# Patient Record
Sex: MALE | Race: WHITE | NOT HISPANIC OR LATINO | Employment: OTHER | URBAN - METROPOLITAN AREA
[De-identification: names, ages, dates, MRNs, and addresses within clinical notes are randomized per-mention and may not be internally consistent; named-entity substitution may affect disease eponyms.]

---

## 2023-12-18 ENCOUNTER — HOSPITAL ENCOUNTER (INPATIENT)
Facility: OTHER | Age: 73
LOS: 3 days | Discharge: HOME | DRG: 322 | End: 2023-12-21
Attending: EMERGENCY MEDICINE | Admitting: INTERNAL MEDICINE
Payer: MEDICARE

## 2023-12-18 DIAGNOSIS — I25.10 CAD (CORONARY ARTERY DISEASE): ICD-10-CM

## 2023-12-18 DIAGNOSIS — I25.10 CORONARY ARTERY DISEASE: ICD-10-CM

## 2023-12-18 DIAGNOSIS — R07.9 CHEST PAIN: ICD-10-CM

## 2023-12-18 DIAGNOSIS — I21.3 STEMI (ST ELEVATION MYOCARDIAL INFARCTION): Primary | ICD-10-CM

## 2023-12-18 LAB
ALBUMIN SERPL BCP-MCNC: 3.8 G/DL (ref 3.5–5.2)
ALP SERPL-CCNC: 62 U/L (ref 55–135)
ALT SERPL W/O P-5'-P-CCNC: 25 U/L (ref 10–44)
ANION GAP SERPL CALC-SCNC: 9 MMOL/L (ref 8–16)
APTT PPP: 24 SEC (ref 21–32)
APTT PPP: 44.9 SEC (ref 21–32)
AST SERPL-CCNC: 57 U/L (ref 10–40)
AV INDEX (PROSTH): 0.68
AV MEAN GRADIENT: 5 MMHG
AV PEAK GRADIENT: 8 MMHG
AV VALVE AREA BY VELOCITY RATIO: 2.94 CM²
AV VALVE AREA: 2.96 CM²
AV VELOCITY RATIO: 0.68
BASOPHILS # BLD AUTO: 0.04 K/UL (ref 0–0.2)
BASOPHILS NFR BLD: 0.3 % (ref 0–1.9)
BILIRUB SERPL-MCNC: 1.1 MG/DL (ref 0.1–1)
BNP SERPL-MCNC: 24 PG/ML (ref 0–99)
BSA FOR ECHO PROCEDURE: 1.95 M2
BUN SERPL-MCNC: 15 MG/DL (ref 8–23)
CALCIUM SERPL-MCNC: 9.1 MG/DL (ref 8.7–10.5)
CHLORIDE SERPL-SCNC: 108 MMOL/L (ref 95–110)
CHOLEST SERPL-MCNC: 176 MG/DL (ref 120–199)
CHOLEST/HDLC SERPL: 4.9 {RATIO} (ref 2–5)
CO2 SERPL-SCNC: 23 MMOL/L (ref 23–29)
CREAT SERPL-MCNC: 1.1 MG/DL (ref 0.5–1.4)
CV ECHO LV RWT: 0.34 CM
DIFFERENTIAL METHOD BLD: ABNORMAL
DOP CALC AO PEAK VEL: 1.43 M/S
DOP CALC AO VTI: 30.9 CM
DOP CALC LVOT AREA: 4.3 CM2
DOP CALC LVOT DIAMETER: 2.35 CM
DOP CALC LVOT PEAK VEL: 0.97 M/S
DOP CALC LVOT STROKE VOLUME: 91.47 CM3
DOP CALCLVOT PEAK VEL VTI: 21.1 CM
E WAVE DECELERATION TIME: 274.85 MSEC
E/A RATIO: 0.75
E/E' RATIO: 9.5 M/S
ECHO LV POSTERIOR WALL: 0.8 CM (ref 0.6–1.1)
EJECTION FRACTION: 45 %
EOSINOPHIL # BLD AUTO: 0 K/UL (ref 0–0.5)
EOSINOPHIL NFR BLD: 0.3 % (ref 0–8)
ERYTHROCYTE [DISTWIDTH] IN BLOOD BY AUTOMATED COUNT: 13.1 % (ref 11.5–14.5)
ERYTHROCYTE [DISTWIDTH] IN BLOOD BY AUTOMATED COUNT: 13.3 % (ref 11.5–14.5)
EST. GFR  (NO RACE VARIABLE): >60 ML/MIN/1.73 M^2
ESTIMATED AVG GLUCOSE: 100 MG/DL (ref 68–131)
FRACTIONAL SHORTENING: 32 % (ref 28–44)
GLUCOSE SERPL-MCNC: 151 MG/DL (ref 70–110)
HBA1C MFR BLD: 5.1 % (ref 4–5.6)
HCT VFR BLD AUTO: 43 % (ref 40–54)
HCT VFR BLD AUTO: 43.8 % (ref 40–54)
HDLC SERPL-MCNC: 36 MG/DL (ref 40–75)
HDLC SERPL: 20.5 % (ref 20–50)
HGB BLD-MCNC: 14.5 G/DL (ref 14–18)
HGB BLD-MCNC: 14.8 G/DL (ref 14–18)
IMM GRANULOCYTES # BLD AUTO: 0.05 K/UL (ref 0–0.04)
IMM GRANULOCYTES NFR BLD AUTO: 0.4 % (ref 0–0.5)
INR PPP: 1 (ref 0.8–1.2)
INTERVENTRICULAR SEPTUM: 0.81 CM (ref 0.6–1.1)
IVRT: 53.28 MSEC
LA MAJOR: 5.75 CM
LA MINOR: 5.41 CM
LA WIDTH: 3.5 CM
LDLC SERPL CALC-MCNC: 116.8 MG/DL (ref 63–159)
LEFT ATRIUM SIZE: 3.44 CM
LEFT ATRIUM VOLUME INDEX: 28 ML/M2
LEFT ATRIUM VOLUME: 57.05 CM3
LEFT INTERNAL DIMENSION IN SYSTOLE: 3.21 CM (ref 2.1–4)
LEFT VENTRICLE DIASTOLIC VOLUME INDEX: 51.08 ML/M2
LEFT VENTRICLE DIASTOLIC VOLUME: 104.2 ML
LEFT VENTRICLE MASS INDEX: 61 G/M2
LEFT VENTRICLE SYSTOLIC VOLUME INDEX: 20.2 ML/M2
LEFT VENTRICLE SYSTOLIC VOLUME: 41.21 ML
LEFT VENTRICULAR INTERNAL DIMENSION IN DIASTOLE: 4.74 CM (ref 3.5–6)
LEFT VENTRICULAR MASS: 125.03 G
LV LATERAL E/E' RATIO: 7.6 M/S
LV SEPTAL E/E' RATIO: 12.67 M/S
LVOT MG: 2.22 MMHG
LVOT MV: 0.71 CM/S
LYMPHOCYTES # BLD AUTO: 0.9 K/UL (ref 1–4.8)
LYMPHOCYTES NFR BLD: 7.8 % (ref 18–48)
MCH RBC QN AUTO: 31.5 PG (ref 27–31)
MCH RBC QN AUTO: 31.9 PG (ref 27–31)
MCHC RBC AUTO-ENTMCNC: 33.7 G/DL (ref 32–36)
MCHC RBC AUTO-ENTMCNC: 33.8 G/DL (ref 32–36)
MCV RBC AUTO: 93 FL (ref 82–98)
MCV RBC AUTO: 94 FL (ref 82–98)
MONOCYTES # BLD AUTO: 0.9 K/UL (ref 0.3–1)
MONOCYTES NFR BLD: 7.9 % (ref 4–15)
MV PEAK A VEL: 1.01 M/S
MV PEAK E VEL: 0.76 M/S
MV STENOSIS PRESSURE HALF TIME: 65.49 MS
MV VALVE AREA P 1/2 METHOD: 3.36 CM2
NEUTROPHILS # BLD AUTO: 9.9 K/UL (ref 1.8–7.7)
NEUTROPHILS NFR BLD: 83.3 % (ref 38–73)
NONHDLC SERPL-MCNC: 140 MG/DL
NRBC BLD-RTO: 0 /100 WBC
PISA MRMAX VEL: 4.31 M/S
PISA TR MAX VEL: 2.36 M/S
PLATELET # BLD AUTO: 187 K/UL (ref 150–450)
PLATELET # BLD AUTO: 190 K/UL (ref 150–450)
PMV BLD AUTO: 10 FL (ref 9.2–12.9)
PMV BLD AUTO: 10.2 FL (ref 9.2–12.9)
POTASSIUM SERPL-SCNC: 4.1 MMOL/L (ref 3.5–5.1)
PROT SERPL-MCNC: 7.3 G/DL (ref 6–8.4)
PROTHROMBIN TIME: 10.9 SEC (ref 9–12.5)
PULM VEIN S/D RATIO: 1.61
PV PEAK D VEL: 0.33 M/S
PV PEAK GRADIENT: 4 MMHG
PV PEAK S VEL: 0.53 M/S
PV PEAK VELOCITY: 1.03 M/S
RA MAJOR: 4.37 CM
RA PRESSURE ESTIMATED: 3 MMHG
RA WIDTH: 3.4 CM
RBC # BLD AUTO: 4.61 M/UL (ref 4.6–6.2)
RBC # BLD AUTO: 4.64 M/UL (ref 4.6–6.2)
RV TB RVSP: 5 MMHG
SODIUM SERPL-SCNC: 140 MMOL/L (ref 136–145)
TDI LATERAL: 0.1 M/S
TDI SEPTAL: 0.06 M/S
TDI: 0.08 M/S
TR MAX PG: 22 MMHG
TRIGL SERPL-MCNC: 116 MG/DL (ref 30–150)
TROPONIN I SERPL DL<=0.01 NG/ML-MCNC: 44.68 NG/ML (ref 0–0.03)
TROPONIN I SERPL DL<=0.01 NG/ML-MCNC: 6.57 NG/ML (ref 0–0.03)
TSH SERPL DL<=0.005 MIU/L-ACNC: 2.16 UIU/ML (ref 0.4–4)
TV REST PULMONARY ARTERY PRESSURE: 25 MMHG
WBC # BLD AUTO: 11.74 K/UL (ref 3.9–12.7)
WBC # BLD AUTO: 11.87 K/UL (ref 3.9–12.7)
Z-SCORE OF LEFT VENTRICULAR DIMENSION IN END DIASTOLE: -2.49
Z-SCORE OF LEFT VENTRICULAR DIMENSION IN END SYSTOLE: -1.18

## 2023-12-18 PROCEDURE — 63600175 PHARM REV CODE 636 W HCPCS: Performed by: INTERNAL MEDICINE

## 2023-12-18 PROCEDURE — 25000003 PHARM REV CODE 250: Performed by: EMERGENCY MEDICINE

## 2023-12-18 PROCEDURE — 85610 PROTHROMBIN TIME: CPT | Performed by: EMERGENCY MEDICINE

## 2023-12-18 PROCEDURE — 80053 COMPREHEN METABOLIC PANEL: CPT | Performed by: EMERGENCY MEDICINE

## 2023-12-18 PROCEDURE — 25000003 PHARM REV CODE 250: Performed by: INTERNAL MEDICINE

## 2023-12-18 PROCEDURE — 84443 ASSAY THYROID STIM HORMONE: CPT | Performed by: EMERGENCY MEDICINE

## 2023-12-18 PROCEDURE — 85027 COMPLETE CBC AUTOMATED: CPT | Performed by: INTERNAL MEDICINE

## 2023-12-18 PROCEDURE — 99291 CRITICAL CARE FIRST HOUR: CPT

## 2023-12-18 PROCEDURE — 99223 1ST HOSP IP/OBS HIGH 75: CPT | Mod: 25,,, | Performed by: INTERNAL MEDICINE

## 2023-12-18 PROCEDURE — 93010 ELECTROCARDIOGRAM REPORT: CPT | Mod: ,,, | Performed by: INTERNAL MEDICINE

## 2023-12-18 PROCEDURE — 36415 COLL VENOUS BLD VENIPUNCTURE: CPT | Performed by: EMERGENCY MEDICINE

## 2023-12-18 PROCEDURE — 93005 ELECTROCARDIOGRAM TRACING: CPT

## 2023-12-18 PROCEDURE — 83036 HEMOGLOBIN GLYCOSYLATED A1C: CPT | Performed by: EMERGENCY MEDICINE

## 2023-12-18 PROCEDURE — 36415 COLL VENOUS BLD VENIPUNCTURE: CPT | Performed by: INTERNAL MEDICINE

## 2023-12-18 PROCEDURE — 84484 ASSAY OF TROPONIN QUANT: CPT | Mod: 91 | Performed by: EMERGENCY MEDICINE

## 2023-12-18 PROCEDURE — 20000000 HC ICU ROOM

## 2023-12-18 PROCEDURE — 85730 THROMBOPLASTIN TIME PARTIAL: CPT | Mod: 91 | Performed by: INTERNAL MEDICINE

## 2023-12-18 PROCEDURE — 94761 N-INVAS EAR/PLS OXIMETRY MLT: CPT

## 2023-12-18 PROCEDURE — 85025 COMPLETE CBC W/AUTO DIFF WBC: CPT | Performed by: EMERGENCY MEDICINE

## 2023-12-18 PROCEDURE — 85730 THROMBOPLASTIN TIME PARTIAL: CPT | Performed by: EMERGENCY MEDICINE

## 2023-12-18 PROCEDURE — 83880 ASSAY OF NATRIURETIC PEPTIDE: CPT | Performed by: EMERGENCY MEDICINE

## 2023-12-18 PROCEDURE — 80061 LIPID PANEL: CPT | Performed by: EMERGENCY MEDICINE

## 2023-12-18 RX ORDER — SODIUM CHLORIDE 9 MG/ML
INJECTION, SOLUTION INTRAVENOUS CONTINUOUS
Status: DISCONTINUED | OUTPATIENT
Start: 2023-12-19 | End: 2023-12-19

## 2023-12-18 RX ORDER — SODIUM CHLORIDE 0.9 % (FLUSH) 0.9 %
10 SYRINGE (ML) INJECTION
Status: DISCONTINUED | OUTPATIENT
Start: 2023-12-18 | End: 2023-12-19

## 2023-12-18 RX ORDER — ACETAMINOPHEN 325 MG/1
650 TABLET ORAL EVERY 6 HOURS PRN
Status: DISCONTINUED | OUTPATIENT
Start: 2023-12-18 | End: 2023-12-21 | Stop reason: HOSPADM

## 2023-12-18 RX ORDER — ONDANSETRON 4 MG/1
4 TABLET, ORALLY DISINTEGRATING ORAL EVERY 6 HOURS PRN
Status: DISCONTINUED | OUTPATIENT
Start: 2023-12-18 | End: 2023-12-21 | Stop reason: HOSPADM

## 2023-12-18 RX ORDER — SODIUM CHLORIDE 0.9 % (FLUSH) 0.9 %
10 SYRINGE (ML) INJECTION
Status: DISCONTINUED | OUTPATIENT
Start: 2023-12-18 | End: 2023-12-21 | Stop reason: HOSPADM

## 2023-12-18 RX ORDER — HEPARIN SODIUM,PORCINE/D5W 25000/250
0-24.5 INTRAVENOUS SOLUTION INTRAVENOUS CONTINUOUS
Status: ACTIVE | OUTPATIENT
Start: 2023-12-18 | End: 2023-12-19

## 2023-12-18 RX ORDER — CLOPIDOGREL 300 MG/1
600 TABLET, FILM COATED ORAL
Status: COMPLETED | OUTPATIENT
Start: 2023-12-18 | End: 2023-12-18

## 2023-12-18 RX ORDER — NITROGLYCERIN 40 MG/1
1 PATCH TRANSDERMAL DAILY
Status: DISCONTINUED | OUTPATIENT
Start: 2023-12-18 | End: 2023-12-19

## 2023-12-18 RX ORDER — ONDANSETRON 2 MG/ML
4 INJECTION INTRAMUSCULAR; INTRAVENOUS EVERY 6 HOURS PRN
Status: DISCONTINUED | OUTPATIENT
Start: 2023-12-18 | End: 2023-12-21 | Stop reason: HOSPADM

## 2023-12-18 RX ORDER — MUPIROCIN 20 MG/G
OINTMENT TOPICAL 2 TIMES DAILY
Status: DISCONTINUED | OUTPATIENT
Start: 2023-12-18 | End: 2023-12-21 | Stop reason: HOSPADM

## 2023-12-18 RX ORDER — ATORVASTATIN CALCIUM 20 MG/1
80 TABLET, FILM COATED ORAL
Status: COMPLETED | OUTPATIENT
Start: 2023-12-18 | End: 2023-12-18

## 2023-12-18 RX ORDER — TALC
6 POWDER (GRAM) TOPICAL NIGHTLY PRN
Status: DISCONTINUED | OUTPATIENT
Start: 2023-12-18 | End: 2023-12-21 | Stop reason: HOSPADM

## 2023-12-18 RX ORDER — CLOPIDOGREL BISULFATE 75 MG/1
75 TABLET ORAL DAILY
Status: DISCONTINUED | OUTPATIENT
Start: 2023-12-19 | End: 2023-12-21 | Stop reason: HOSPADM

## 2023-12-18 RX ORDER — HEPARIN SODIUM,PORCINE/D5W 25000/250
0-24.5 INTRAVENOUS SOLUTION INTRAVENOUS CONTINUOUS
Status: DISCONTINUED | OUTPATIENT
Start: 2023-12-18 | End: 2023-12-18

## 2023-12-18 RX ORDER — DIPHENHYDRAMINE HCL 25 MG
25 CAPSULE ORAL
Status: DISCONTINUED | OUTPATIENT
Start: 2023-12-19 | End: 2023-12-19 | Stop reason: HOSPADM

## 2023-12-18 RX ORDER — ASPIRIN 81 MG/1
81 TABLET ORAL DAILY
Status: DISCONTINUED | OUTPATIENT
Start: 2023-12-19 | End: 2023-12-21 | Stop reason: HOSPADM

## 2023-12-18 RX ADMIN — HEPARIN SODIUM 12 UNITS/KG/HR: 10000 INJECTION, SOLUTION INTRAVENOUS at 08:12

## 2023-12-18 RX ADMIN — ATORVASTATIN CALCIUM 80 MG: 20 TABLET, FILM COATED ORAL at 12:12

## 2023-12-18 RX ADMIN — CLOPIDOGREL BISULFATE 600 MG: 300 TABLET, FILM COATED ORAL at 10:12

## 2023-12-18 RX ADMIN — MUPIROCIN: 20 OINTMENT TOPICAL at 08:12

## 2023-12-18 RX ADMIN — NITROGLYCERIN 1 PATCH: 0.2 PATCH TRANSDERMAL at 12:12

## 2023-12-18 RX ADMIN — Medication 12 UNITS/KG/HR: at 10:12

## 2023-12-18 RX ADMIN — MUPIROCIN: 20 OINTMENT TOPICAL at 12:12

## 2023-12-18 NOTE — ASSESSMENT & PLAN NOTE
CAD  - EKG unavailable for review from [facility] ED at time of H&P but reportedly lateral ST elevations. EKG here with sinus bradycardia, nonspecific ST-T changes. Troponin 9.85 from [facility] records, 6.57 here.  - Reports extensive family history of CAD and MI resulting in death of his brother at age 55.  - Received aspirin at [facility]; continue 81mg PO daily. Clopidogrel loaded 600mg PO x1 in ED, continue 75mg PO daily. Continue heparin gtt. Start nitroglycerin 0.2mg/hr patch transdermal daily.  - Check lipid panel, TSH, HgbA1c.  - Cardiology consulted; appreciate assistance. Plan for [facility] tomorrow AM.

## 2023-12-18 NOTE — HPI
Mr. Spencer is a 73/M with no significant PMH who presented to [facility] as a transfer from [facility] ED with a one day history of chest discomfort, nausea, and diaphoresis worsened with activity. He reports symptoms have been off and on over the past month and a half; had started taking glutamine as a supplement and noted after initiation episodes of intermittent chest burning. Stopped taking glutamine after one week and symptoms improved some but noted intermittent recurring chest burning worse with exertion. This gradually worsened over the following weeks with more frequent episodes caused with less exertion until the morning of presentation he noted significant chest burning radiating to bilateral arms, diaphoresis, and nausea as well as shortness of breath. Symptoms were severe enough he decided to contact 911. He notes mild sinus congestion and cough over the past week but did not have significant fevers, chills, palpitations, abdominal pain, or vomiting. Upon arrival to [facility] ED he was noted to have lateral ST elevations on EKG and troponin of 9.85. He received aspirin PO and tenecteplase as well as nitroglycerin SL and ointment topically. He noted improvement in his symptoms with these treatments but reports mild burning noted to chest at time of evaluation. He was subsequently transferred to [facility] for cardiology evaluation for catheterization. Cardiology consulted in ED and recommended heparin gtt, aspirin, clopidogrel, and LHC for 12/19. Hospital medicine was contacted for admission.

## 2023-12-18 NOTE — Clinical Note
170 ml of contrast were injected throughout the case. 30 mL of contrast was the total wasted during the case. 200 mL was the total amount used during the case.

## 2023-12-18 NOTE — SUBJECTIVE & OBJECTIVE
"History reviewed. No pertinent past medical history.    Past Surgical History:   Procedure Laterality Date    TONSILLECTOMY      WISDOM TOOTH EXTRACTION         Review of patient's allergies indicates:   Allergen Reactions    Meperidine Other (See Comments)     Oversedation - reports "was out for three days" after receiving in past     No current facility-administered medications on file prior to encounter.     No current outpatient medications on file prior to encounter.     Family History       Problem Relation (Age of Onset)    Heart disease Mother, Father, Brother    Hypertension Brother    Stroke Mother          Tobacco Use    Smoking status: Never    Smokeless tobacco: Never   Substance and Sexual Activity    Alcohol use: Yes     Comment: Socially - less than monthly    Drug use: Not Currently     Comment: Rarely marijuana when in ; none since    Sexual activity: Not on file     Review of Systems   Constitutional:  Positive for diaphoresis and fatigue. Negative for chills and fever.   HENT:  Positive for sinus pressure. Negative for sore throat and trouble swallowing.    Eyes:  Negative for pain and visual disturbance.   Respiratory:  Positive for cough and shortness of breath.    Cardiovascular:  Positive for chest pain. Negative for palpitations.   Gastrointestinal:  Positive for nausea. Negative for abdominal pain and vomiting.   Genitourinary:  Negative for difficulty urinating and dysuria.   Musculoskeletal:  Negative for arthralgias and joint swelling.   Skin:  Negative for rash and wound.   Neurological:  Negative for weakness and numbness.     Objective:     Vital Signs (Most Recent):  Pulse: 63 (12/18/23 1002)  Resp: 19 (12/18/23 0928)  BP: 99/63 (12/18/23 1002)  SpO2: 100 % (12/18/23 1002) Vital Signs (24h Range):  Pulse:  [61-63] 63  Resp:  [19] 19  SpO2:  [100 %] 100 %  BP: (97-99)/(56-63) 99/63     Weight: 81.6 kg (180 lb)  Body mass index is 25.83 kg/m².     Physical Exam  Vitals and " nursing note reviewed.   Constitutional:       General: He is not in acute distress.     Appearance: He is well-developed.   HENT:      Head: Normocephalic and atraumatic.   Eyes:      General:         Right eye: No discharge.         Left eye: No discharge.      Conjunctiva/sclera: Conjunctivae normal.   Cardiovascular:      Rate and Rhythm: Normal rate.      Pulses: Normal pulses.   Pulmonary:      Effort: Pulmonary effort is normal. No respiratory distress.   Abdominal:      Palpations: Abdomen is soft.      Tenderness: There is no abdominal tenderness.   Musculoskeletal:         General: Normal range of motion.      Right lower leg: No edema.      Left lower leg: No edema.   Skin:     General: Skin is warm and dry.   Neurological:      Mental Status: He is alert and oriented to person, place, and time.        Significant Labs:   CBC:  Recent Labs   Lab 12/18/23  0944   WBC 11.87   HGB 14.8   HCT 43.8      GRAN 83.3*  9.9*   LYMPH 7.8*  0.9*   MONO 7.9  0.9   EOS 0.0   BASO 0.04   CMP:  Recent Labs   Lab 12/18/23  0944      K 4.1      CO2 23   BUN 15   CREATININE 1.1   *   CALCIUM 9.1   ALKPHOS 62   AST 57*   ALT 25   BILITOT 1.1*   PROT 7.3   ALBUMIN 3.8   ANIONGAP 9     Recent Labs   Lab 12/18/23  0944   TROPONINI 6.574*     Significant Imaging: I have reviewed and interpreted all pertinent imaging results/findings within the past 24 hours.

## 2023-12-18 NOTE — ED TRIAGE NOTES
Pt transfer from [facility] for STEMI. Pt reports crushing CP started 2 weeks ago, but he thought it may be contributed to a new maedication he is taking for his eye, so he stopped medication. Pt states CP continued. Pt denies any known medical hx. Initial EKG at [facility] read STEMI. Pt given 324 ASA, 4 SL nitro, nitro paste, and TNK bolus from [place]/ EMS with improvement in symptoms. Pt reports mild burning CP at this time. Initial EKG on arrival no longer showed STEMI.

## 2023-12-18 NOTE — ED PROVIDER NOTES
"Encounter Date: 12/18/2023    SCRIBE #1 NOTE: I, Lizbeth Toscano, am scribing for, and in the presence of,  Kiki Lyons MD. I have scribed the entire note.       History     Chief Complaint   Patient presents with    Chest Pain     Pt transfer from [facility] for STEMI. Pt endorsing chest burning and nausea x weeks but stating "I thought it was the new pills I was taking for my eyes but it was still there when I stopped taking them." Denies cardiac hx and SOB. Pt given 324 ASA, 45 mg TNK, 3 SL nitro, and 1 inch nitro paste pta.      Time seen by provider: 9:16 AM    This is a 73 y.o. male who presents via transfer from [facility] for STEMI activation in the setting of chest pain. He states he has had chest discomfort along with sinus congestion for the past four days. The latter was improving with irrigation. He awoke early this morning sweating with more intense pain. At its peak he rates it at a 9/10, radiating into the back and arms. The sweating ceased after approximately one hour and he states he currently only has mild chest discomfort and nausea. He denies any shortness of breath. Patient was taken to [facility] by EMS and improved with aspirin and a TNK bolus. This is the extent of the patient's complaints at this time.    The history is provided by the patient and the EMS personnel.     Review of patient's allergies indicates:   Allergen Reactions    Meperidine Other (See Comments)     Oversedation - reports "was out for three days" after receiving in past     History reviewed. No pertinent past medical history.  Past Surgical History:   Procedure Laterality Date    CORONARY ANGIOGRAPHY N/A 12/19/2023    Procedure: ANGIOGRAM, CORONARY ARTERY;  Surgeon: Grace Panda MD;  Location: [facility] CATH LAB;  Service: Cardiology;  Laterality: N/A;    STENT, DRUG ELUTING, SINGLE VESSEL, CORONARY  12/19/2023    Procedure: Stent, Drug Eluting, Single Vessel, Coronary;  Surgeon: Grace Panda MD;  Location: " [facility] CATH LAB;  Service: Cardiology;;    TONSILLECTOMY      WISDOM TOOTH EXTRACTION       Family History   Problem Relation Age of Onset    Stroke Mother     Heart disease Mother     Heart disease Father     Hypertension Brother     Heart disease Brother      Social History     Tobacco Use    Smoking status: Never    Smokeless tobacco: Never   Substance Use Topics    Alcohol use: Yes     Comment: Socially - less than monthly    Drug use: Not Currently     Comment: Rarely marijuana when in ; none since     Review of Systems   Constitutional:  Positive for diaphoresis. Negative for fever.   HENT:  Negative for sore throat.    Respiratory:  Positive for cough. Negative for shortness of breath.    Cardiovascular:  Negative for chest pain.   Gastrointestinal:  Positive for nausea.   Genitourinary:  Negative for dysuria.   Musculoskeletal:  Negative for back pain.   Skin:  Negative for rash.   Neurological:  Negative for weakness.   Hematological:  Does not bruise/bleed easily.       Physical Exam     Initial Vitals   BP Pulse Resp Temp SpO2   12/18/23 0921 12/18/23 0928 12/18/23 0928 12/18/23 1126 12/18/23 0928   (!) 97/56 61 19 99.1 °F (37.3 °C) 100 %      MAP       --                Physical Exam    Nursing note and vitals reviewed.  Constitutional: He appears well-developed and well-nourished. He is not diaphoretic. No distress.   HENT:   Head: Normocephalic and atraumatic.   Eyes: Conjunctivae are normal. No scleral icterus.   Neck: No JVD present.   Normal range of motion.  Cardiovascular:  Regular rhythm.           No murmur heard.  Heart rate in the 60's.   Pulmonary/Chest: Effort normal and breath sounds normal. No tachypnea. No respiratory distress. He has no wheezes. He has no rhonchi. He has no rales.   Abdominal: Abdomen is soft. He exhibits no distension. There is no abdominal tenderness. There is no rebound and no guarding.   Musculoskeletal:         General: Normal range of motion.      Cervical  back: Normal range of motion.      Comments: No extremity edema.     Neurological: He is alert and oriented to person, place, and time.   Skin: Skin is warm. Capillary refill takes less than 2 seconds. No rash noted. No erythema. There is pallor.   Psychiatric: He has a normal mood and affect. His behavior is normal.         ED Course   Critical Care    Date/Time: 12/18/2023 9:26 AM    Performed by: Kiki Lyons MD  Authorized by: Kiki Lyons MD  Direct patient critical care time: 10 minutes  Additional history critical care time: 5 minutes  Ordering / reviewing critical care time: 6 minutes  Documentation critical care time: 7 minutes  Consulting other physicians critical care time: 5 minutes  Total critical care time (exclusive of procedural time) : 33 minutes  Critical care was necessary to treat or prevent imminent or life-threatening deterioration of the following conditions: cardiac failure.  Critical care was time spent personally by me on the following activities: discussions with consultants, evaluation of patient's response to treatment, examination of patient, obtaining history from patient or surrogate, ordering and performing treatments and interventions, ordering and review of laboratory studies, ordering and review of radiographic studies, pulse oximetry and re-evaluation of patient's condition.        Labs Reviewed   CBC W/ AUTO DIFFERENTIAL - Abnormal; Notable for the following components:       Result Value    MCH 31.9 (*)     Gran # (ANC) 9.9 (*)     Immature Grans (Abs) 0.05 (*)     Lymph # 0.9 (*)     Gran % 83.3 (*)     Lymph % 7.8 (*)     All other components within normal limits   COMPREHENSIVE METABOLIC PANEL - Abnormal; Notable for the following components:    Glucose 151 (*)     Total Bilirubin 1.1 (*)     AST 57 (*)     All other components within normal limits   TROPONIN I - Abnormal; Notable for the following components:    Troponin I 6.574 (*)     All other components within  normal limits   B-TYPE NATRIURETIC PEPTIDE   APTT   PROTIME-INR   LIPID PANEL   TSH   TSH   HEMOGLOBIN A1C     EKG Readings: (Independently Interpreted)   Sinus bradycardia. Rate of 52. No ST elevation. Mild ST depression in inferior leads.      ECG Results              EKG 12-lead (Final result)  Result time 12/19/23 11:32:32      Final result by Interface, Lab In [facility] (12/19/23 11:32:32)                   Narrative:    Test Reason : R07.9,    Vent. Rate : 052 BPM     Atrial Rate : 052 BPM     P-R Int : 160 ms          QRS Dur : 100 ms      QT Int : 452 ms       P-R-T Axes : 063 077 077 degrees     QTc Int : 420 ms    Sinus bradycardia  Nonspecific ST abnormality  Abnormal ECG    Confirmed by Grace Panda MD ([id number]) on 12/19/2023 11:32:19 AM    Referred By: TUNDE PEARSON           Confirmed By:Grace Panda MD                                  Imaging Results    None          Medications   heparin 25,000 units in dextrose 5% 250 mL (100 units/mL) infusion LOW INTENSITY nomogram - [facility] (0 Units/kg/hr × 85.7 kg Intravenous Stopped 12/19/23 0359)   0.9%  NaCl infusion ( Intravenous Verify Only 12/19/23 1307)   clopidogreL tablet 600 mg (600 mg Oral Given 12/18/23 1000)   heparin 25,000 units in dextrose 5% (100 units/ml) IV bolus from bag INITIAL BOLUS (max bolus 4000 units) (4,000 Units Intravenous Bolus from Bag 12/18/23 1038)   atorvastatin tablet 80 mg (80 mg Oral Given 12/18/23 1251)     Medical Decision Making  Initial impression: 73 year old male transferred following thrombolytics for STEMI with troponin greater than 9. Discussed patient prior to arrival with Dr. Panda and EKG performed on arrival with resolution of ST elevation. Plan repeat enzymes, admit to ICU hospital medicine, echocardiogram, heparin, Plavix stat.      Problems Addressed:  STEMI (ST elevation myocardial infarction): acute illness or injury that poses a threat to life or bodily functions    Amount and/or Complexity of Data  Reviewed  Labs: ordered.    Risk  Prescription drug management.  Decision regarding hospitalization.            Scribe Attestation:   Scribe #1: I performed the above scribed service and the documentation accurately describes the services I performed. I attest to the accuracy of the note.    Physician Attestation for Scribe: I, Kiki Lyons MD, reviewed documentation as scribed in my presence, which is both accurate and complete.        ED Course as of 01/01/24 1412   Mon Dec 18, 2023   0926 Dr. Panda at bedside.  EKG on arrival - no ST elevation.   [LF]      ED Course User Index  [LF] Kiki Lyons MD          I discussed the patient's presentation, findings and response to treatment in the ED with the hospitalist on call who will admit for further treatment and evaluation.                     Clinical Impression:  Final diagnoses:  [R07.9] Chest pain  [I21.3] STEMI (ST elevation myocardial infarction) (Primary)          ED Disposition Condition    Admit                 Kiki Lyons MD  01/01/24 1412

## 2023-12-18 NOTE — Clinical Note
Diagnosis: STEMI (ST elevation myocardial infarction) [[id number]]   Future Attending Provider: JACKIE BOWERS [[id number]]   Admitting Provider:: JACKIE BOWERS [[id number]]   Reason for IP Medical Treatment  (Clinical interventions that can only be accomplished in the IP setting? ) :: Close monitoring, heparin drip   I certify that Inpatient services for greater than or equal to 2 midnights are medically necessary:: Yes   Plans for Post-Acute care--if anticipated (pick the single best option):: A. No post acute care anticipated at this time   Special Needs:: Complex IV Med Admin [7]

## 2023-12-18 NOTE — H&P
"  [facility] Emergency [facility] Medicine  History & Physical    Patient Name: Francisco Spencer  MRN: [id number]  Patient Class: IP- Inpatient  Admission Date: 12/18/2023  Attending Physician: SUAD Koroma MD  Primary Care Provider: Enriqueta Gonzalez MD    Patient information was obtained from patient and ER records.     Subjective:     Principal Problem:ST elevation myocardial infarction (STEMI)    Chief Complaint:   Chief Complaint   Patient presents with    Chest Pain     Pt transfer from [facility] for STEMI. Pt endorsing chest burning and nausea x weeks but stating "I thought it was the new pills I was taking for my eyes but it was still there when I stopped taking them." Denies cardiac hx and SOB. Pt given 324 ASA, 45 mg TNK, 3 SL nitro, and 1 inch nitro paste pta.         HPI: Mr. Spencer is a 73/M with no significant PMH who presented to [facility] as a transfer from [facility] ED with a one day history of chest discomfort, nausea, and diaphoresis worsened with activity. He reports symptoms have been off and on over the past month and a half; had started taking glutamine as a supplement and noted after initiation episodes of intermittent chest burning. Stopped taking glutamine after one week and symptoms improved some but noted intermittent recurring chest burning worse with exertion. This gradually worsened over the following weeks with more frequent episodes caused with less exertion until the morning of presentation he noted significant chest burning radiating to bilateral arms, diaphoresis, and nausea as well as shortness of breath. Symptoms were severe enough he decided to contact 911. He notes mild sinus congestion and cough over the past week but did not have significant fevers, chills, palpitations, abdominal pain, or vomiting. Upon arrival to [facility] ED he was noted to have lateral ST elevations on EKG and troponin of 9.85. He received aspirin PO and tenecteplase as well as " "nitroglycerin SL and ointment topically. He noted improvement in his symptoms with these treatments but reports mild burning noted to chest at time of evaluation. He was subsequently transferred to [facility] for cardiology evaluation for catheterization. Cardiology consulted in ED and recommended heparin gtt, aspirin, clopidogrel, and LHC for 12/19. Hospital medicine was contacted for admission.    History reviewed. No pertinent past medical history.    Past Surgical History:   Procedure Laterality Date    TONSILLECTOMY      WISDOM TOOTH EXTRACTION         Review of patient's allergies indicates:   Allergen Reactions    Meperidine Other (See Comments)     Oversedation - reports "was out for three days" after receiving in past     No current facility-administered medications on file prior to encounter.     No current outpatient medications on file prior to encounter.     Family History       Problem Relation (Age of Onset)    Heart disease Mother, Father, Brother    Hypertension Brother    Stroke Mother          Tobacco Use    Smoking status: Never    Smokeless tobacco: Never   Substance and Sexual Activity    Alcohol use: Yes     Comment: Socially - less than monthly    Drug use: Not Currently     Comment: Rarely marijuana when in ; none since    Sexual activity: Not on file     Review of Systems   Constitutional:  Positive for diaphoresis and fatigue. Negative for chills and fever.   HENT:  Positive for sinus pressure. Negative for sore throat and trouble swallowing.    Eyes:  Negative for pain and visual disturbance.   Respiratory:  Positive for cough and shortness of breath.    Cardiovascular:  Positive for chest pain. Negative for palpitations.   Gastrointestinal:  Positive for nausea. Negative for abdominal pain and vomiting.   Genitourinary:  Negative for difficulty urinating and dysuria.   Musculoskeletal:  Negative for arthralgias and joint swelling.   Skin:  Negative for rash and wound. "   Neurological:  Negative for weakness and numbness.     Objective:     Vital Signs (Most Recent):  Pulse: 63 (12/18/23 1002)  Resp: 19 (12/18/23 0928)  BP: 99/63 (12/18/23 1002)  SpO2: 100 % (12/18/23 1002) Vital Signs (24h Range):  Pulse:  [61-63] 63  Resp:  [19] 19  SpO2:  [100 %] 100 %  BP: (97-99)/(56-63) 99/63     Weight: 81.6 kg (180 lb)  Body mass index is 25.83 kg/m².     Physical Exam  Vitals and nursing note reviewed.   Constitutional:       General: He is not in acute distress.     Appearance: He is well-developed.   HENT:      Head: Normocephalic and atraumatic.   Eyes:      General:         Right eye: No discharge.         Left eye: No discharge.      Conjunctiva/sclera: Conjunctivae normal.   Cardiovascular:      Rate and Rhythm: Normal rate.      Pulses: Normal pulses.   Pulmonary:      Effort: Pulmonary effort is normal. No respiratory distress.   Abdominal:      Palpations: Abdomen is soft.      Tenderness: There is no abdominal tenderness.   Musculoskeletal:         General: Normal range of motion.      Right lower leg: No edema.      Left lower leg: No edema.   Skin:     General: Skin is warm and dry.   Neurological:      Mental Status: He is alert and oriented to person, place, and time.        Significant Labs:   CBC:  Recent Labs   Lab 12/18/23  0944   WBC 11.87   HGB 14.8   HCT 43.8      GRAN 83.3*  9.9*   LYMPH 7.8*  0.9*   MONO 7.9  0.9   EOS 0.0   BASO 0.04   CMP:  Recent Labs   Lab 12/18/23  0944      K 4.1      CO2 23   BUN 15   CREATININE 1.1   *   CALCIUM 9.1   ALKPHOS 62   AST 57*   ALT 25   BILITOT 1.1*   PROT 7.3   ALBUMIN 3.8   ANIONGAP 9     Recent Labs   Lab 12/18/23  0944   TROPONINI 6.574*     Significant Imaging: I have reviewed and interpreted all pertinent imaging results/findings within the past 24 hours.    Assessment/Plan:     * ST elevation myocardial infarction (STEMI)  CAD  - EKG unavailable for review from [facility] ED at time of H&P but  reportedly lateral ST elevations. EKG here with sinus bradycardia, nonspecific ST-T changes. Troponin 9.85 from [facility] records, 6.57 here.  - Reports extensive family history of CAD and MI resulting in death of his brother at age 55.  - Received aspirin at [facility]; continue 81mg PO daily. Clopidogrel loaded 600mg PO x1 in ED, continue 75mg PO daily. Continue heparin gtt. Start nitroglycerin 0.2mg/hr patch transdermal daily.  - Check lipid panel, TSH, HgbA1c.  - Cardiology consulted; appreciate assistance. Plan for [facility] tomorrow AM.      VTE Risk Mitigation (From admission, onward)           Ordered     heparin 25,000 units in dextrose 5% (100 units/ml) IV bolus from bag - ADDITIONAL PRN BOLUS - 60 units/kg (max bolus 4000 units)  As needed (PRN)        Question:  Heparin Infusion Adjustment (DO NOT MODIFY ANSWER)  Answer:  [email or web address]\epic\Images\Pharmacy\HeparinInfusions\heparin LOW INTENSITY nomogram for [facility] [id number]    12/18/23 0953     heparin 25,000 units in dextrose 5% (100 units/ml) IV bolus from bag - ADDITIONAL PRN BOLUS - 30 units/kg (max bolus 4000 units)  As needed (PRN)        Question:  Heparin Infusion Adjustment (DO NOT MODIFY ANSWER)  Answer:  \\[email or web address]\epic\Images\Pharmacy\HeparinInfusions\heparin LOW INTENSITY nomogram for [facility] [id number]    12/18/23 0953     IP VTE HIGH RISK PATIENT  Once         12/18/23 1003     Place sequential compression device  Until discontinued         12/18/23 1003     heparin 25,000 units in dextrose 5% 250 mL (100 units/mL) infusion LOW INTENSITY nomogram - [facility]  Continuous        Question:  Begin at (units/kg/hr)  Answer:  12    12/18/23 0953                  SUAD Koroma MD  Department of Hospital Medicine  [facility] Dept

## 2023-12-18 NOTE — CONSULTS
[facility] Emergency Dept  Cardiology  Consult Note    Patient Name: Francisco Spencer  MRN: [id number]  Admission Date: 12/18/2023  Hospital Length of Stay: 0 days  Code Status: No Order   Attending Provider: Kiki Lyons MD   Consulting Provider: Grace Panda MD  Primary Care Physician: No primary care provider on file.  Principal Problem:ST elevation myocardial infarction (STEMI)    Patient information was obtained from patient, ER records, and primary team.     Consults  Subjective:     Chief Complaint:  Chest pain.  HPI:     Francisco Spencer is a 73 y.o.male with an unremarkable past medical history. He denies risk factors for vascular disease. In early 11/2023 he began to experience chest pain when exercising like walking at a moderate pace or doing exercises at home. After a few days he had less pain and the pain only came on if doing vigorous exercises. This continued into 12/2023. At about 5 am on 12/18/2023 he woke up from chest pain. He became mildly short of breath and began sweating. He presented to the emergency room at [place]. There was about 3 mm ST elevation in leads I and aVL. He received TNK and it was arranged for him to be transferred. As he was in the ambulance the chest pain resolved. He appears to have had a rhythm change at that time with AIVR. He was taken to [facility] for his further care. On arrival her the chest pain had resolved.        No past medical history on file.    No past surgical history on file.    Review of patient's allergies indicates:  No Known Allergies    No current facility-administered medications on file prior to encounter.     No current outpatient medications on file prior to encounter.     Family History    None       Tobacco Use    Smoking status: Not on file    Smokeless tobacco: Not on file   Substance and Sexual Activity    Alcohol use: Not on file    Drug use: Not on file    Sexual activity: Not on file     Review of Systems    Constitutional: Positive for diaphoresis. Negative for chills, fever and malaise/fatigue.   HENT:  Negative for nosebleeds and tinnitus.    Eyes:  Negative for double vision, vision loss in left eye and vision loss in right eye.   Cardiovascular:  Positive for chest pain. Negative for claudication, dyspnea on exertion, irregular heartbeat, leg swelling, near-syncope, orthopnea, palpitations, paroxysmal nocturnal dyspnea and syncope.   Respiratory:  Positive for shortness of breath. Negative for cough, hemoptysis and wheezing.    Endocrine: Negative for cold intolerance and heat intolerance.   Hematologic/Lymphatic: Negative for bleeding problem. Does not bruise/bleed easily.   Skin:  Negative for color change and rash.   Musculoskeletal:  Negative for back pain, falls, muscle weakness and myalgias.   Gastrointestinal:  Negative for abdominal pain, diarrhea, dysphagia, heartburn, hematemesis, hematochezia, hemorrhoids, jaundice, melena, nausea and vomiting.   Genitourinary:  Negative for dysuria and hematuria.   Neurological:  Negative for dizziness, focal weakness, headaches, light-headedness, loss of balance, numbness, tremors, vertigo and weakness.   Psychiatric/Behavioral:  Negative for altered mental status, depression and memory loss. The patient is not nervous/anxious.    Allergic/Immunologic: Negative for hives and persistent infections.     Objective:     Vital Signs (Most Recent):  Pulse: 61 (12/18/23 0928)  Resp: 19 (12/18/23 0928)  BP: (!) 97/56 (12/18/23 0921)  SpO2: 100 % (12/18/23 0928) Vital Signs (24h Range):  Pulse:  [61] 61  Resp:  [19] 19  SpO2:  [100 %] 100 %  BP: (97)/(56) 97/56     Weight: 81.6 kg (180 lb)  There is no height or weight on file to calculate BMI.    SpO2: 100 %       No intake or output data in the 24 hours ending 12/18/23 0945    Lines/Drains/Airways       None                   Physical Exam  Constitutional:       General: He is not in acute distress.     Appearance: Normal  appearance. He is well-developed. He is not toxic-appearing or diaphoretic.   HENT:      Head: Normocephalic and atraumatic.      Nose: Nose normal.   Eyes:      General:         Right eye: No discharge.         Left eye: No discharge.      Conjunctiva/sclera:      Right eye: Right conjunctiva is not injected.      Left eye: Left conjunctiva is not injected.      Pupils: Pupils are equal.      Right eye: Pupil is round.      Left eye: Pupil is round.   Neck:      Thyroid: No thyromegaly.      Vascular: No carotid bruit or JVD.   Cardiovascular:      Rate and Rhythm: Normal rate and regular rhythm. No extrasystoles are present.     Chest Wall: PMI is not displaced.      Pulses:           Radial pulses are 2+ on the right side and 2+ on the left side.        Femoral pulses are 2+ on the right side and 2+ on the left side.       Dorsalis pedis pulses are 2+ on the right side and 2+ on the left side.        Posterior tibial pulses are 2+ on the right side and 2+ on the left side.      Heart sounds: S1 normal and S2 normal. No murmur heard.     No gallop.   Pulmonary:      Effort: Pulmonary effort is normal.      Breath sounds: Normal breath sounds.   Abdominal:      Palpations: Abdomen is soft.      Tenderness: There is no abdominal tenderness.   Musculoskeletal:      Cervical back: Neck supple.      Right lower leg: Normal. No swelling. No edema.      Left lower leg: Normal. No swelling. No edema.   Lymphadenopathy:      Head:      Right side of head: No submandibular adenopathy.      Left side of head: No submandibular adenopathy.      Cervical: No cervical adenopathy.   Skin:     General: Skin is warm and dry.      Findings: No rash.      Nails: There is no clubbing.   Neurological:      General: No focal deficit present.      Mental Status: He is alert and oriented to person, place, and time. He is not disoriented.      Cranial Nerves: No cranial nerve deficit.   Psychiatric:         Attention and Perception:  Attention normal.         Mood and Affect: Mood and affect normal.         Speech: Speech normal.         Behavior: Behavior normal.         Thought Content: Thought content normal.         Cognition and Memory: Cognition and memory normal.         Judgment: Judgment normal.       Current Medications:     clopidogreL  600 mg Oral ED 1 Time     Current Laboratory Results:    No results found for this or any previous visit (from the past 24 hour(s)).  Current Imaging Results:    No orders to display       Assessment and Plan:     Active Diagnoses:    Diagnosis Date Noted POA    PRINCIPAL PROBLEM:  ST elevation myocardial infarction (STEMI) [I21.3] 12/18/2023 Yes    Coronary artery disease [I25.10] 12/18/2023 Yes      Problems Resolved During this Admission:     Coronary Artery Disease   11/2023: Began to experience angina.  12/18/2023: 5 AM: Chest pain at rest. Lateral ST elevation. Received TNK. CP resolved. AIVR.  Plan aspirin, clopidogrel and heparin iv.  NTG patch.  Echo today.  12/19/2023: Plan cath. All issues discussed. Consent signed. Access right radial.    The planned procedure was discussed in detail with the patient and the family members present. Risk, benefit and alternatives were reviewed. All questions answered. Consent was then signed.    If further questions or concerns arise the patient was encouraged to contact me prior to the planned procedure.          VTE Risk Mitigation (From admission, onward)      None            Thank you for your consult.     I will follow-up with patient. Please contact us if you have any additional questions.    Grace Panda MD  Cardiology   [facility] - Emergency Dept

## 2023-12-19 PROBLEM — Z95.5 HISTORY OF CORONARY ARTERY STENT PLACEMENT: Status: ACTIVE | Noted: 2023-12-19

## 2023-12-19 LAB
ANION GAP SERPL CALC-SCNC: 11 MMOL/L (ref 8–16)
APTT PPP: 35.4 SEC (ref 21–32)
APTT PPP: 48.7 SEC (ref 21–32)
BASOPHILS # BLD AUTO: 0.05 K/UL (ref 0–0.2)
BASOPHILS # BLD AUTO: 0.07 K/UL (ref 0–0.2)
BASOPHILS NFR BLD: 0.4 % (ref 0–1.9)
BASOPHILS NFR BLD: 0.7 % (ref 0–1.9)
BUN SERPL-MCNC: 15 MG/DL (ref 8–23)
CALCIUM SERPL-MCNC: 9.3 MG/DL (ref 8.7–10.5)
CHLORIDE SERPL-SCNC: 105 MMOL/L (ref 95–110)
CO2 SERPL-SCNC: 25 MMOL/L (ref 23–29)
CREAT SERPL-MCNC: 1 MG/DL (ref 0.5–1.4)
DIFFERENTIAL METHOD BLD: ABNORMAL
DIFFERENTIAL METHOD BLD: ABNORMAL
EOSINOPHIL # BLD AUTO: 0.1 K/UL (ref 0–0.5)
EOSINOPHIL # BLD AUTO: 0.2 K/UL (ref 0–0.5)
EOSINOPHIL NFR BLD: 0.9 % (ref 0–8)
EOSINOPHIL NFR BLD: 2.2 % (ref 0–8)
ERYTHROCYTE [DISTWIDTH] IN BLOOD BY AUTOMATED COUNT: 13.3 % (ref 11.5–14.5)
ERYTHROCYTE [DISTWIDTH] IN BLOOD BY AUTOMATED COUNT: 13.3 % (ref 11.5–14.5)
EST. GFR  (NO RACE VARIABLE): >60 ML/MIN/1.73 M^2
GLUCOSE SERPL-MCNC: 103 MG/DL (ref 70–110)
HCT VFR BLD AUTO: 40.1 % (ref 40–54)
HCT VFR BLD AUTO: 41.1 % (ref 40–54)
HGB BLD-MCNC: 13.4 G/DL (ref 14–18)
HGB BLD-MCNC: 14 G/DL (ref 14–18)
IMM GRANULOCYTES # BLD AUTO: 0.03 K/UL (ref 0–0.04)
IMM GRANULOCYTES # BLD AUTO: 0.03 K/UL (ref 0–0.04)
IMM GRANULOCYTES NFR BLD AUTO: 0.3 % (ref 0–0.5)
IMM GRANULOCYTES NFR BLD AUTO: 0.3 % (ref 0–0.5)
LYMPHOCYTES # BLD AUTO: 1.8 K/UL (ref 1–4.8)
LYMPHOCYTES # BLD AUTO: 1.9 K/UL (ref 1–4.8)
LYMPHOCYTES NFR BLD: 16 % (ref 18–48)
LYMPHOCYTES NFR BLD: 18.4 % (ref 18–48)
MAGNESIUM SERPL-MCNC: 1.9 MG/DL (ref 1.6–2.6)
MCH RBC QN AUTO: 31.3 PG (ref 27–31)
MCH RBC QN AUTO: 31.6 PG (ref 27–31)
MCHC RBC AUTO-ENTMCNC: 33.4 G/DL (ref 32–36)
MCHC RBC AUTO-ENTMCNC: 34.1 G/DL (ref 32–36)
MCV RBC AUTO: 93 FL (ref 82–98)
MCV RBC AUTO: 94 FL (ref 82–98)
MONOCYTES # BLD AUTO: 1.2 K/UL (ref 0.3–1)
MONOCYTES # BLD AUTO: 1.4 K/UL (ref 0.3–1)
MONOCYTES NFR BLD: 12.1 % (ref 4–15)
MONOCYTES NFR BLD: 12.3 % (ref 4–15)
NEUTROPHILS # BLD AUTO: 6.4 K/UL (ref 1.8–7.7)
NEUTROPHILS # BLD AUTO: 8.2 K/UL (ref 1.8–7.7)
NEUTROPHILS NFR BLD: 66.1 % (ref 38–73)
NEUTROPHILS NFR BLD: 70.3 % (ref 38–73)
NRBC BLD-RTO: 0 /100 WBC
NRBC BLD-RTO: 0 /100 WBC
PHOSPHATE SERPL-MCNC: 3 MG/DL (ref 2.7–4.5)
PLATELET # BLD AUTO: 154 K/UL (ref 150–450)
PLATELET # BLD AUTO: 173 K/UL (ref 150–450)
PMV BLD AUTO: 10.2 FL (ref 9.2–12.9)
PMV BLD AUTO: 9.9 FL (ref 9.2–12.9)
POC ACTIVATED CLOTTING TIME K: 277 SEC (ref 74–137)
POTASSIUM SERPL-SCNC: 4.3 MMOL/L (ref 3.5–5.1)
RBC # BLD AUTO: 4.28 M/UL (ref 4.6–6.2)
RBC # BLD AUTO: 4.43 M/UL (ref 4.6–6.2)
SAMPLE: ABNORMAL
SODIUM SERPL-SCNC: 141 MMOL/L (ref 136–145)
WBC # BLD AUTO: 11.68 K/UL (ref 3.9–12.7)
WBC # BLD AUTO: 9.73 K/UL (ref 3.9–12.7)

## 2023-12-19 PROCEDURE — 027135Z DILATION OF CORONARY ARTERY, TWO ARTERIES WITH TWO DRUG-ELUTING INTRALUMINAL DEVICES, PERCUTANEOUS APPROACH: ICD-10-PCS | Performed by: INTERNAL MEDICINE

## 2023-12-19 PROCEDURE — 25000003 PHARM REV CODE 250: Performed by: INTERNAL MEDICINE

## 2023-12-19 PROCEDURE — C9600 PERC DRUG-EL COR STENT SING: HCPCS | Mod: LD | Performed by: INTERNAL MEDICINE

## 2023-12-19 PROCEDURE — 99152 MOD SED SAME PHYS/QHP 5/>YRS: CPT | Performed by: INTERNAL MEDICINE

## 2023-12-19 PROCEDURE — 83735 ASSAY OF MAGNESIUM: CPT | Performed by: INTERNAL MEDICINE

## 2023-12-19 PROCEDURE — 85025 COMPLETE CBC W/AUTO DIFF WBC: CPT | Performed by: EMERGENCY MEDICINE

## 2023-12-19 PROCEDURE — 25500020 PHARM REV CODE 255: Performed by: INTERNAL MEDICINE

## 2023-12-19 PROCEDURE — 85025 COMPLETE CBC W/AUTO DIFF WBC: CPT | Mod: 91 | Performed by: INTERNAL MEDICINE

## 2023-12-19 PROCEDURE — 80048 BASIC METABOLIC PNL TOTAL CA: CPT | Performed by: INTERNAL MEDICINE

## 2023-12-19 PROCEDURE — 99153 MOD SED SAME PHYS/QHP EA: CPT | Performed by: INTERNAL MEDICINE

## 2023-12-19 PROCEDURE — C1725 CATH, TRANSLUMIN NON-LASER: HCPCS | Performed by: INTERNAL MEDICINE

## 2023-12-19 PROCEDURE — 93454 CORONARY ARTERY ANGIO S&I: CPT | Performed by: INTERNAL MEDICINE

## 2023-12-19 PROCEDURE — 85730 THROMBOPLASTIN TIME PARTIAL: CPT | Mod: 91 | Performed by: INTERNAL MEDICINE

## 2023-12-19 PROCEDURE — 36415 COLL VENOUS BLD VENIPUNCTURE: CPT | Performed by: INTERNAL MEDICINE

## 2023-12-19 PROCEDURE — C1887 CATHETER, GUIDING: HCPCS | Performed by: INTERNAL MEDICINE

## 2023-12-19 PROCEDURE — 63600175 PHARM REV CODE 636 W HCPCS: Performed by: INTERNAL MEDICINE

## 2023-12-19 PROCEDURE — C1894 INTRO/SHEATH, NON-LASER: HCPCS | Performed by: INTERNAL MEDICINE

## 2023-12-19 PROCEDURE — 85730 THROMBOPLASTIN TIME PARTIAL: CPT | Performed by: EMERGENCY MEDICINE

## 2023-12-19 PROCEDURE — 84100 ASSAY OF PHOSPHORUS: CPT | Performed by: INTERNAL MEDICINE

## 2023-12-19 PROCEDURE — 20000000 HC ICU ROOM

## 2023-12-19 PROCEDURE — 92928 PRQ TCAT PLMT NTRAC ST 1 LES: CPT | Mod: LD,,, | Performed by: INTERNAL MEDICINE

## 2023-12-19 PROCEDURE — C1874 STENT, COATED/COV W/DEL SYS: HCPCS | Performed by: INTERNAL MEDICINE

## 2023-12-19 PROCEDURE — 99499 UNLISTED E&M SERVICE: CPT | Mod: ,,, | Performed by: INTERNAL MEDICINE

## 2023-12-19 PROCEDURE — 99152 MOD SED SAME PHYS/QHP 5/>YRS: CPT | Mod: ,,, | Performed by: INTERNAL MEDICINE

## 2023-12-19 PROCEDURE — 36415 COLL VENOUS BLD VENIPUNCTURE: CPT | Performed by: EMERGENCY MEDICINE

## 2023-12-19 PROCEDURE — 85347 COAGULATION TIME ACTIVATED: CPT | Performed by: INTERNAL MEDICINE

## 2023-12-19 PROCEDURE — 27201423 OPTIME MED/SURG SUP & DEVICES STERILE SUPPLY: Performed by: INTERNAL MEDICINE

## 2023-12-19 PROCEDURE — C1769 GUIDE WIRE: HCPCS | Performed by: INTERNAL MEDICINE

## 2023-12-19 PROCEDURE — 94761 N-INVAS EAR/PLS OXIMETRY MLT: CPT

## 2023-12-19 PROCEDURE — 93454 CORONARY ARTERY ANGIO S&I: CPT | Mod: 26,51,XS, | Performed by: INTERNAL MEDICINE

## 2023-12-19 PROCEDURE — B2111ZZ FLUOROSCOPY OF MULTIPLE CORONARY ARTERIES USING LOW OSMOLAR CONTRAST: ICD-10-PCS | Performed by: INTERNAL MEDICINE

## 2023-12-19 DEVICE — EVEROLIMUS-ELUTING PLATINUM CHROMIUM CORONARY STENT SYSTEM
Type: IMPLANTABLE DEVICE | Site: CORONARY | Status: FUNCTIONAL
Brand: SYNERGY™ XD

## 2023-12-19 RX ORDER — MIDAZOLAM HYDROCHLORIDE 1 MG/ML
INJECTION INTRAMUSCULAR; INTRAVENOUS
Status: DISCONTINUED | OUTPATIENT
Start: 2023-12-19 | End: 2023-12-19 | Stop reason: HOSPADM

## 2023-12-19 RX ORDER — HEPARIN SODIUM 1000 [USP'U]/ML
INJECTION, SOLUTION INTRAVENOUS; SUBCUTANEOUS
Status: DISCONTINUED | OUTPATIENT
Start: 2023-12-19 | End: 2023-12-19 | Stop reason: HOSPADM

## 2023-12-19 RX ORDER — LIDOCAINE HYDROCHLORIDE 10 MG/ML
INJECTION, SOLUTION EPIDURAL; INFILTRATION; INTRACAUDAL; PERINEURAL
Status: DISCONTINUED | OUTPATIENT
Start: 2023-12-19 | End: 2023-12-19 | Stop reason: HOSPADM

## 2023-12-19 RX ORDER — ONDANSETRON 8 MG/1
8 TABLET, ORALLY DISINTEGRATING ORAL EVERY 8 HOURS PRN
Status: DISCONTINUED | OUTPATIENT
Start: 2023-12-19 | End: 2023-12-21 | Stop reason: HOSPADM

## 2023-12-19 RX ORDER — SODIUM CHLORIDE 9 MG/ML
INJECTION, SOLUTION INTRAVENOUS CONTINUOUS
Status: ACTIVE | OUTPATIENT
Start: 2023-12-19 | End: 2023-12-19

## 2023-12-19 RX ORDER — FENTANYL CITRATE 50 UG/ML
INJECTION, SOLUTION INTRAMUSCULAR; INTRAVENOUS
Status: DISCONTINUED | OUTPATIENT
Start: 2023-12-19 | End: 2023-12-19 | Stop reason: HOSPADM

## 2023-12-19 RX ORDER — ATROPINE SULFATE 0.1 MG/ML
0.5 INJECTION INTRAVENOUS
Status: DISCONTINUED | OUTPATIENT
Start: 2023-12-19 | End: 2023-12-21 | Stop reason: HOSPADM

## 2023-12-19 RX ORDER — ACETAMINOPHEN 325 MG/1
650 TABLET ORAL EVERY 4 HOURS PRN
Status: DISCONTINUED | OUTPATIENT
Start: 2023-12-19 | End: 2023-12-21 | Stop reason: HOSPADM

## 2023-12-19 RX ADMIN — CLOPIDOGREL BISULFATE 75 MG: 75 TABLET ORAL at 10:12

## 2023-12-19 RX ADMIN — ASPIRIN 81 MG: 81 TABLET, COATED ORAL at 10:12

## 2023-12-19 RX ADMIN — MUPIROCIN: 20 OINTMENT TOPICAL at 09:12

## 2023-12-19 RX ADMIN — SODIUM CHLORIDE: 9 INJECTION, SOLUTION INTRAVENOUS at 03:12

## 2023-12-19 NOTE — PROGRESS NOTES
[facility] Intensive Care [facility]  Cardiology  Progress Note    Patient Name: Francisco Spencer  MRN: [id number]  Admission Date: 12/18/2023  Hospital Length of Stay: 1 days  Code Status: Full Code   Attending Physician: JACKIE Koroma MD   Primary Care Physician: Enriqueta Gonzalez MD  Expected Discharge Date:   Principal Problem:ST elevation myocardial infarction (STEMI)    Subjective:     Brief HPI:    Francisco Spencer is a 73 y.o.male with an unremarkable past medical history. He denies risk factors for vascular disease. In early 11/2023 he began to experience chest pain when exercising like walking at a moderate pace or doing exercises at home. After a few days he had less pain with the pain only coming on if doing vigorous exercises. This continued into 12/2023. At about 5 am on 12/18/2023 he woke up from chest pain. He became mildly short of breath and began sweating. He presented to the emergency room at [place]. There was about 3 mm ST elevation in leads I and aVL. He received TNK and it was arranged for him to be transferred. As he was in the ambulance the chest pain resolved. He appears to have had a rhythm change at that time with AIVR. He was taken to [facility] for his further care. On arrival her the chest pain had resolved.        Hospital Course:     Heparin iv, aspirin and clopidogrel load.    NTG patch.    12/18/2023: Echo: Normal left ventricular size with overall mild systolic dysfunction. Mid anteroseptal and anterior walls, apical septum and apex severely hypokinetic.  Remainder contract well. EF 45%. Mild diastolic dysfunction.     Interval History:     No CP or SOB.    No arrhythmias.    Review of Systems   Constitutional: Negative for chills, fever and malaise/fatigue.   HENT:  Negative for nosebleeds.    Eyes:  Negative for vision loss in left eye and vision loss in right eye.   Cardiovascular:  Negative for chest pain, leg swelling, orthopnea, palpitations and  paroxysmal nocturnal dyspnea.   Respiratory:  Negative for cough, hemoptysis, shortness of breath, sputum production and wheezing.    Hematologic/Lymphatic: Negative for bleeding problem. Does not bruise/bleed easily.   Skin:  Negative for color change and rash.   Musculoskeletal:  Negative for muscle weakness and myalgias.   Gastrointestinal:  Negative for abdominal pain, heartburn, hematemesis, hematochezia, melena, nausea and vomiting.   Genitourinary:  Negative for hematuria.   Neurological:  Negative for dizziness, focal weakness, headaches, light-headedness, vertigo and weakness.   Psychiatric/Behavioral:  Negative for altered mental status. The patient is not nervous/anxious.    Allergic/Immunologic: Negative for persistent infections.     Objective:     Vital Signs (Most Recent):  Temp: 97.5 °F (36.4 °C) (12/19/23 0301)  Pulse: 67 (12/19/23 0600)  Resp: 16 (12/19/23 0600)  BP: (!) 99/58 (12/19/23 0600)  SpO2: 96 % (12/19/23 0600) Vital Signs (24h Range):  Temp:  [97.5 °F (36.4 °C)-99.1 °F (37.3 °C)] 97.5 °F (36.4 °C)  Pulse:  [52-83] 67  Resp:  [9-40] 16  SpO2:  [92 %-100 %] 96 %  BP: ()/(43-75) 99/58     Weight: 85.7 kg (188 lb 15 oz)  Body mass index is 27.11 kg/m².    SpO2: 96 %         Intake/Output Summary (Last 24 hours) at 12/19/2023 0713  Last data filed at 12/19/2023 0555  Gross per 24 hour   Intake 1462.36 ml   Output 1900 ml   Net -437.64 ml       Lines/Drains/Airways       Peripheral Intravenous Line  Duration                  Peripheral IV - Single Lumen 12/18/23 0912 18 G Distal;Left;Posterior Forearm <1 day         Peripheral IV - Single Lumen 12/18/23 0912 18 G Posterior;Right Hand <1 day         Peripheral IV - Single Lumen 12/18/23 0945 18 G Anterior;Distal;Left Upper Arm <1 day                    Physical Exam  Constitutional:       General: He is not in acute distress.     Appearance: Normal appearance. He is well-developed. He is not ill-appearing.   Eyes:      Conjunctiva/sclera:       Right eye: Right conjunctiva is not injected. No hemorrhage.     Left eye: Left conjunctiva is not injected. No hemorrhage.     Pupils:      Right eye: Pupil is round.      Left eye: Pupil is round.   Neck:      Vascular: No JVD.   Cardiovascular:      Rate and Rhythm: Normal rate and regular rhythm.      Heart sounds: S1 normal and S2 normal.   Pulmonary:      Effort: Pulmonary effort is normal.      Breath sounds: Normal breath sounds.   Chest:      Chest wall: No swelling or tenderness.   Abdominal:      General: There is no distension.      Palpations: Abdomen is soft.      Tenderness: There is no abdominal tenderness.   Musculoskeletal:      Cervical back: Neck supple.      Right ankle: No swelling.      Left ankle: No swelling.   Skin:     General: Skin is warm and dry.      Findings: No rash.   Neurological:      Mental Status: He is alert and oriented to person, place, and time. He is not disoriented.   Psychiatric:         Attention and Perception: Attention normal.         Mood and Affect: Mood normal.         Speech: Speech normal.         Behavior: Behavior normal.         Thought Content: Thought content normal.         Cognition and Memory: Cognition and memory normal.         Judgment: Judgment normal.         Current Medications:     aspirin  81 mg Oral Daily    clopidogreL  75 mg Oral Daily    lactated ringers  500 mL Intravenous Once    mupirocin   Nasal BID    nitroGLYCERIN 0.2 mg/hr TD PT24  1 patch Transdermal Daily     Current Laboratory Results:    Recent Results (from the past 24 hour(s))   CBC auto differential    Collection Time: 12/18/23  9:44 AM   Result Value Ref Range    WBC 11.87 3.90 - 12.70 K/uL    RBC 4.64 4.60 - 6.20 M/uL    Hemoglobin 14.8 14.0 - 18.0 g/dL    Hematocrit 43.8 40.0 - 54.0 %    MCV 94 82 - 98 fL    MCH 31.9 (H) 27.0 - 31.0 pg    MCHC 33.8 32.0 - 36.0 g/dL    RDW 13.1 11.5 - 14.5 %    Platelets 190 150 - 450 K/uL    MPV 10.2 9.2 - 12.9 fL    Immature Granulocytes  0.4 0.0 - 0.5 %    Gran # (ANC) 9.9 (H) 1.8 - 7.7 K/uL    Immature Grans (Abs) 0.05 (H) 0.00 - 0.04 K/uL    Lymph # 0.9 (L) 1.0 - 4.8 K/uL    Mono # 0.9 0.3 - 1.0 K/uL    Eos # 0.0 0.0 - 0.5 K/uL    Baso # 0.04 0.00 - 0.20 K/uL    nRBC 0 0 /100 WBC    Gran % 83.3 (H) 38.0 - 73.0 %    Lymph % 7.8 (L) 18.0 - 48.0 %    Mono % 7.9 4.0 - 15.0 %    Eosinophil % 0.3 0.0 - 8.0 %    Basophil % 0.3 0.0 - 1.9 %    Differential Method Automated    Comprehensive metabolic panel    Collection Time: 12/18/23  9:44 AM   Result Value Ref Range    Sodium 140 136 - 145 mmol/L    Potassium 4.1 3.5 - 5.1 mmol/L    Chloride 108 95 - 110 mmol/L    CO2 23 23 - 29 mmol/L    Glucose 151 (H) 70 - 110 mg/dL    BUN 15 8 - 23 mg/dL    Creatinine 1.1 0.5 - 1.4 mg/dL    Calcium 9.1 8.7 - 10.5 mg/dL    Total Protein 7.3 6.0 - 8.4 g/dL    Albumin 3.8 3.5 - 5.2 g/dL    Total Bilirubin 1.1 (H) 0.1 - 1.0 mg/dL    Alkaline Phosphatase 62 55 - 135 U/L    AST 57 (H) 10 - 40 U/L    ALT 25 10 - 44 U/L    eGFR >60 >60 mL/min/1.73 m^2    Anion Gap 9 8 - 16 mmol/L   Troponin I #1    Collection Time: 12/18/23  9:44 AM   Result Value Ref Range    Troponin I 6.574 (H) 0.000 - 0.026 ng/mL   B-Type natriuretic peptide (BNP)    Collection Time: 12/18/23  9:44 AM   Result Value Ref Range    BNP 24 0 - 99 pg/mL   APTT    Collection Time: 12/18/23  9:44 AM   Result Value Ref Range    aPTT 24.0 21.0 - 32.0 sec   Protime-INR    Collection Time: 12/18/23  9:44 AM   Result Value Ref Range    Prothrombin Time 10.9 9.0 - 12.5 sec    INR 1.0 0.8 - 1.2   TSH    Collection Time: 12/18/23  9:44 AM   Result Value Ref Range    TSH 2.160 0.400 - 4.000 uIU/mL   Lipid Panel    Collection Time: 12/18/23  9:44 AM   Result Value Ref Range    Cholesterol 176 120 - 199 mg/dL    Triglycerides 116 30 - 150 mg/dL    HDL 36 (L) 40 - 75 mg/dL    LDL Cholesterol 116.8 63.0 - 159.0 mg/dL    HDL/Cholesterol Ratio 20.5 20.0 - 50.0 %    Total Cholesterol/HDL Ratio 4.9 2.0 - 5.0    Non-HDL  Cholesterol 140 mg/dL   Hemoglobin A1C    Collection Time: 12/18/23  9:44 AM   Result Value Ref Range    Hemoglobin A1C 5.1 4.0 - 5.6 %    Estimated Avg Glucose 100 68 - 131 mg/dL   Echo    Collection Time: 12/18/23 11:05 AM   Result Value Ref Range    BSA 1.95 m2    LVOT stroke volume 91.47 cm3    LVIDd 4.74 3.5 - 6.0 cm    LV Systolic Volume 41.21 mL    LV Systolic Volume Index 20.2 mL/m2    LVIDs 3.21 2.1 - 4.0 cm    LV Diastolic Volume 104.20 mL    LV Diastolic Volume Index 51.08 mL/m2    IVS 0.81 0.6 - 1.1 cm    LVOT diameter 2.35 cm    LVOT area 4.3 cm2    FS 32 28 - 44 %    Left Ventricle Relative Wall Thickness 0.34 cm    Posterior Wall 0.80 0.6 - 1.1 cm    LV mass 125.03 g    LV Mass Index 61 g/m2    MV Peak E [place] 0.76 m/s    TDI LATERAL 0.10 m/s    TDI SEPTAL 0.06 m/s    E/E' ratio 9.50 m/s    MV Peak A [place] 1.01 m/s    TR Max [place] 2.36 m/s    E/A ratio 0.75     IVRT 53.28 msec    E wave deceleration time 274.85 msec    LV SEPTAL E/E' RATIO 12.67 m/s    LV LATERAL E/E' RATIO 7.60 m/s    PV Peak S [place] 0.53 m/s    PV Peak D [place] 0.33 m/s    Pulm vein S/D ratio 1.61     LVOT peak milan 0.97 m/s    Left Ventricular Outflow Tract Mean Velocity 0.71 cm/s    Left Ventricular Outflow Tract Mean Gradient 2.22 mmHg    LA size 3.44 cm    Left Atrium Minor Axis 5.41 cm    Left Atrium Major Axis 5.75 cm    RA Major Axis 4.37 cm    AV mean gradient 5 mmHg    AV peak gradient 8 mmHg    Ao peak milan 1.43 m/s    Ao VTI 30.90 cm    LVOT peak VTI 21.10 cm    AV valve area 2.96 cm²    AV Velocity Ratio 0.68     AV index (prosthetic) 0.68     STEFFANY by Velocity Ratio 2.94 cm²    Mr max milan 4.31 m/s    MV stenosis pressure 1/2 time 65.49 ms    MV valve area p 1/2 method 3.36 cm2    Triscuspid Valve Regurgitation Peak Gradient 22 mmHg    PV PEAK VELOCITY 1.03 m/s    PV peak gradient 4 mmHg    Mean e' 0.08 m/s    ZLVIDS -1.18     ZLVIDD -2.49     LA Volume Index 28.0 mL/m2    LA volume 57.05 cm3    LA WIDTH 3.5 cm    RA Width 3.4 cm    TV  resting pulmonary artery pressure 25 mmHg    RV TB RVSP 5 mmHg    Est. RA pres 3 mmHg    EF 45 %   Troponin I #2    Collection Time: 12/18/23 12:22 PM   Result Value Ref Range    Troponin I 44.679 (H) 0.000 - 0.026 ng/mL   Hematology Profile    Collection Time: 12/18/23 12:22 PM   Result Value Ref Range    WBC 11.74 3.90 - 12.70 K/uL    RBC 4.61 4.60 - 6.20 M/uL    Hemoglobin 14.5 14.0 - 18.0 g/dL    Hematocrit 43.0 40.0 - 54.0 %    MCV 93 82 - 98 fL    MCH 31.5 (H) 27.0 - 31.0 pg    MCHC 33.7 32.0 - 36.0 g/dL    RDW 13.3 11.5 - 14.5 %    Platelets 187 150 - 450 K/uL    MPV 10.0 9.2 - 12.9 fL   APTT    Collection Time: 12/18/23  6:44 PM   Result Value Ref Range    aPTT 44.9 (H) 21.0 - 32.0 sec   APTT    Collection Time: 12/19/23 12:27 AM   Result Value Ref Range    aPTT 48.7 (H) 21.0 - 32.0 sec   APTT    Collection Time: 12/19/23  5:05 AM   Result Value Ref Range    aPTT 35.4 (H) 21.0 - 32.0 sec   CBC auto differential    Collection Time: 12/19/23  5:05 AM   Result Value Ref Range    WBC 11.68 3.90 - 12.70 K/uL    RBC 4.43 (L) 4.60 - 6.20 M/uL    Hemoglobin 14.0 14.0 - 18.0 g/dL    Hematocrit 41.1 40.0 - 54.0 %    MCV 93 82 - 98 fL    MCH 31.6 (H) 27.0 - 31.0 pg    MCHC 34.1 32.0 - 36.0 g/dL    RDW 13.3 11.5 - 14.5 %    Platelets 173 150 - 450 K/uL    MPV 9.9 9.2 - 12.9 fL    Immature Granulocytes 0.3 0.0 - 0.5 %    Gran # (ANC) 8.2 (H) 1.8 - 7.7 K/uL    Immature Grans (Abs) 0.03 0.00 - 0.04 K/uL    Lymph # 1.9 1.0 - 4.8 K/uL    Mono # 1.4 (H) 0.3 - 1.0 K/uL    Eos # 0.1 0.0 - 0.5 K/uL    Baso # 0.05 0.00 - 0.20 K/uL    nRBC 0 0 /100 WBC    Gran % 70.3 38.0 - 73.0 %    Lymph % 16.0 (L) 18.0 - 48.0 %    Mono % 12.1 4.0 - 15.0 %    Eosinophil % 0.9 0.0 - 8.0 %    Basophil % 0.4 0.0 - 1.9 %    Differential Method Automated    Basic Metabolic Panel    Collection Time: 12/19/23  6:29 AM   Result Value Ref Range    Sodium 141 136 - 145 mmol/L    Potassium 4.3 3.5 - 5.1 mmol/L    Chloride 105 95 - 110 mmol/L    CO2 25 23 -  29 mmol/L    Glucose 103 70 - 110 mg/dL    BUN 15 8 - 23 mg/dL    Creatinine 1.0 0.5 - 1.4 mg/dL    Calcium 9.3 8.7 - 10.5 mg/dL    Anion Gap 11 8 - 16 mmol/L    eGFR >60 >60 mL/min/1.73 m^2   Magnesium    Collection Time: 12/19/23  6:29 AM   Result Value Ref Range    Magnesium 1.9 1.6 - 2.6 mg/dL   Phosphorus    Collection Time: 12/19/23  6:29 AM   Result Value Ref Range    Phosphorus 3.0 2.7 - 4.5 mg/dL     Current Imaging Results:    No orders to display       12/18/2023: Echo:  Left Ventricle: The left ventricle is normal in size. Ventricular mass is normal. Normal wall thickness. Regional wall motion abnormalities present. The mid anteroseptal and anterior walls, the apical septum and apex are severely hypokinetic. The remainder of the left ventricular walls contract well. There is mildly reduced systolic function. Ejection fraction by visual approximation is 45%. Grade I diastolic dysfunction. Normal left ventricular filling pressure. Tissue Doppler velocity is reduced. Average E/e' ratio is 9.50.  Right Ventricle: Normal right ventricular cavity size. Wall thickness is normal. Right ventricle wall motion  is normal. Systolic function is normal.  Left Atrium: Left atrium is mildly dilated.  IVC/SVC: Normal venous pressure at 3 mmHg.        Assessment and Plan:     Problem List:    Active Diagnoses:    Diagnosis Date Noted POA    PRINCIPAL PROBLEM:  ST elevation myocardial infarction (STEMI) [I21.3] 12/18/2023 Yes    Coronary artery disease [I25.10] 12/18/2023 Yes      Problems Resolved During this Admission:     Assessment and Plan:    Coronary Artery Disease              11/2023: Began to experience angina.  12/18/2023: 5 AM: Chest pain at rest. Lateral ST elevation. Received TNK. CP resolved. AIVR. Troponin 45.  12/18/2023: Echo: Normal left ventricular size with overall mild systolic dysfunction. Mid anteroseptal and anterior walls, apical septum and apex severely hypokinetic. Remainder contract well. EF  45%. Mild diastolic dysfunction.   12/18/2023: Chol 176. HDL 36. . .  On atorvastatin 80 mg Q24.   On aspirin 81 mg Q24, clopidogrel 75 mg Q24 and heparin iv.  On NTG patch 0.2 mg/hr.  12/19/2023: Plan cath. All issues discussed. Consent signed. Access right radial.     The planned procedure was discussed in detail with the patient and the family members present. Risk, benefit and alternatives were reviewed. All questions answered. Consent was then signed.     If further questions or concerns arise the patient was encouraged to contact me prior to the planned procedure.     VTE Risk Mitigation (From admission, onward)           Ordered     heparin 25,000 units in dextrose 5% 250 mL (100 units/mL) infusion LOW INTENSITY nomogram - [facility]  Continuous        Question:  Begin at (units/kg/hr)  Answer:  12    12/18/23 1218     heparin 25,000 units in dextrose 5% (100 units/ml) IV bolus from bag - ADDITIONAL PRN BOLUS - 60 units/kg (max bolus 4000 units)  As needed (PRN)        Question:  Heparin Infusion Adjustment (DO NOT MODIFY ANSWER)  Answer:  [email or web address]\Images\Pharmacy\HeparinInfusions\heparin LOW INTENSITY nomogram for [facility] [id number]    12/18/23 1223     heparin 25,000 units in dextrose 5% (100 units/ml) IV bolus from bag - ADDITIONAL PRN BOLUS - 30 units/kg (max bolus 4000 units)  As needed (PRN)        Question:  Heparin Infusion Adjustment (DO NOT MODIFY ANSWER)  Answer:  \[email or web address]\Images\Pharmacy\HeparinInfusions\heparin LOW INTENSITY nomogram for [facility] [id number]    12/18/23 1222     IP VTE HIGH RISK PATIENT  Once         12/18/23 1003     Place sequential compression device  Until discontinued         12/18/23 1003                    Grace Panda MD  Cardiology  [facility] ([place])

## 2023-12-19 NOTE — PROGRESS NOTES
[facility] - Intensive Care [facility] Medicine  Progress Note    Patient Name: Francisco Spencer  MRN: [id number]  Patient Class: IP- Inpatient   Admission Date: 12/18/2023  Length of Stay: 1 days  Attending Physician: JACKIE Koroma MD  Primary Care Provider: Enriqueta Gonzalez MD        Subjective:     Principal Problem:ST elevation myocardial infarction (STEMI)        HPI:  Mr. Spencer is a 73/M with no significant PMH who presented to [facility] as a transfer from [facility] ED with a one day history of chest discomfort, nausea, and diaphoresis worsened with activity. He reports symptoms have been off and on over the past month and a half; had started taking glutamine as a supplement and noted after initiation episodes of intermittent chest burning. Stopped taking glutamine after one week and symptoms improved some but noted intermittent recurring chest burning worse with exertion. This gradually worsened over the following weeks with more frequent episodes caused with less exertion until the morning of presentation he noted significant chest burning radiating to bilateral arms, diaphoresis, and nausea as well as shortness of breath. Symptoms were severe enough he decided to contact 911. He notes mild sinus congestion and cough over the past week but did not have significant fevers, chills, palpitations, abdominal pain, or vomiting. Upon arrival to [facility] ED he was noted to have lateral ST elevations on EKG and troponin of 9.85. He received aspirin PO and tenecteplase as well as nitroglycerin SL and ointment topically. He noted improvement in his symptoms with these treatments but reports mild burning noted to chest at time of evaluation. He was subsequently transferred to [facility] for cardiology evaluation for catheterization. Cardiology consulted in ED and recommended heparin gtt, aspirin, clopidogrel, and LHC for 12/19. Hospital medicine was contacted for admission.    Overview/Hospital  Course:  No notes on file    Interval History: No acute events overnight. Seen post-cath. Feeling well. Discussed with patient and family at bedside. No other concerns at this time.    Review of Systems   Constitutional:  Negative for chills and fever.   Respiratory:  Negative for cough and shortness of breath.    Cardiovascular:  Negative for chest pain and palpitations.   Gastrointestinal:  Negative for abdominal pain, nausea and vomiting.     Objective:     Vital Signs (Most Recent):  Temp: 99 °F (37.2 °C) (12/19/23 1912)  Pulse: 74 (12/19/23 1912)  Resp: (!) 28 (12/19/23 1912)  BP: 106/70 (12/19/23 1900)  SpO2: 95 % (12/19/23 1912) Vital Signs (24h Range):  Temp:  [97.5 °F (36.4 °C)-99 °F (37.2 °C)] 99 °F (37.2 °C)  Pulse:  [52-91] 74  Resp:  [0-37] 28  SpO2:  [92 %-98 %] 95 %  BP: ()/(43-72) 106/70     Weight: 85.7 kg (188 lb 15 oz)  Body mass index is 27.11 kg/m².    Intake/Output Summary (Last 24 hours) at 12/19/2023 1932  Last data filed at 12/19/2023 1747  Gross per 24 hour   Intake 1504.82 ml   Output 1550 ml   Net -45.18 ml         Physical Exam  Vitals and nursing note reviewed.   Constitutional:       General: He is not in acute distress.     Appearance: He is well-developed.   HENT:      Head: Normocephalic and atraumatic.   Eyes:      General:         Right eye: No discharge.         Left eye: No discharge.      Conjunctiva/sclera: Conjunctivae normal.   Cardiovascular:      Rate and Rhythm: Normal rate.      Pulses: Normal pulses.   Pulmonary:      Effort: Pulmonary effort is normal. No respiratory distress.   Abdominal:      Palpations: Abdomen is soft.      Tenderness: There is no abdominal tenderness.   Musculoskeletal:         General: Normal range of motion.      Right lower leg: No edema.      Left lower leg: No edema.   Skin:     General: Skin is warm and dry.   Neurological:      Mental Status: He is alert and oriented to person, place, and time.     Significant Labs:   CBC:  Recent  Labs   Lab 12/18/23  0944 12/18/23  1222 12/19/23  0505   WBC 11.87 11.74 11.68   HGB 14.8 14.5 14.0   HCT 43.8 43.0 41.1    187 173   GRAN 83.3*  9.9*  --  70.3  8.2*   LYMPH 7.8*  0.9*  --  16.0*  1.9   MONO 7.9  0.9  --  12.1  1.4*   EOS 0.0  --  0.1   BASO 0.04  --  0.05   BMP:  Recent Labs   Lab 12/18/23  0944 12/19/23  0629    141   K 4.1 4.3    105   CO2 23 25   BUN 15 15   CREATININE 1.1 1.0   * 103   CALCIUM 9.1 9.3   MG  --  1.9   PHOS  --  3.0     Recent Labs   Lab 12/18/23  0944 12/18/23  1222   TROPONINI 6.574* 44.679*     Significant Imaging: I have reviewed and interpreted all pertinent imaging results/findings within the past 24 hours.      Assessment/Plan:      * ST elevation myocardial infarction (STEMI)  CAD  - EKG unavailable for review from [facility] ED at time of H&P but reportedly lateral ST elevations. EKG here with sinus bradycardia, nonspecific ST-T changes. Troponin 9.85 from [facility] records, 6.57 here.  - Reports extensive family history of CAD and MI resulting in death of his brother at age 55.  - Continue aspirin 81mg PO daily, clopidogrel 75mg PO daily. .8. Started atorvastatin 80mg PO daily. Continue nitroglycerin 0.2mg/hr patch transdermal daily. To [facility] this morning.  - HgbA1c 5.1.  - Cardiology consulted; appreciate assistance.      VTE Risk Mitigation (From admission, onward)           Ordered     heparin 25,000 units in dextrose 5% 250 mL (100 units/mL) infusion LOW INTENSITY nomogram - [facility]  Continuous        Question:  Begin at (units/kg/hr)  Answer:  12    12/18/23 1218     IP VTE HIGH RISK PATIENT  Once         12/18/23 1003     Place sequential compression device  Until discontinued         12/18/23 1003                    Discharge Planning   DANNIE:      Code Status: Full Code   Is the patient medically ready for discharge?:     Reason for patient still in hospital (select all that apply): Treatment                     D Tyron Greenwoodbert,  MD  Department of Hospital Medicine   [facility] - Intensive Care ([place])

## 2023-12-19 NOTE — NURSING
IRIS Fried arrived from Cath lab to bring pt to procedure. Placed on monitor and assisted in transfer. Explained process to pt. MD Crystal was present at bedside for update. Pt tolerated well.

## 2023-12-19 NOTE — PLAN OF CARE
Problem: Adult Inpatient Plan of Care  Goal: Plan of Care Review  Outcome: Ongoing, Progressing  Flowsheets (Taken 12/19/2023 0441)  Plan of Care Reviewed With: patient  Goal: Patient-Specific Goal (Individualized)  Outcome: Ongoing, Progressing  Goal: Absence of Hospital-Acquired Illness or Injury  Outcome: Ongoing, Progressing  Intervention: Prevent and Manage VTE (Venous Thromboembolism) Risk  Flowsheets (Taken 12/19/2023 0441)  Activity Management: Ambulated to bathroom - L4  VTE Prevention/Management:   bleeding risk assessed   bleeding precations maintained   fluids promoted  Range of Motion: active ROM (range of motion) encouraged  Goal: Optimal Comfort and Wellbeing  Outcome: Ongoing, Progressing     Problem: Fall Injury Risk  Goal: Absence of Fall and Fall-Related Injury  Outcome: Ongoing, Progressing  Intervention: Promote Injury-Free Environment  Flowsheets (Taken 12/19/2023 0441)  Safety Promotion/Fall Prevention:   assistive device/personal item within reach   side rails raised x 2   room near unit station

## 2023-12-19 NOTE — BRIEF OP NOTE
12/19/2023: [facility]BC: LAD & LCX separate ostea. LAD: Proximal 95%. Mid lengthy 80%. Calcified. D1: Osteal 95%. LCX: Mild. RCA: Dominant. Mild. LAD: Proximal RUBEN 3.0 x 15 mm to 3.2 mm. Mid PTCA 2.5 mm balloon & 2.5 mm cutting balloon. Not possible to cross with stent.                       Grace Panda M.D.

## 2023-12-19 NOTE — NURSING
Received phone call from Heidi in Neuro/ EEG department at [facility].  She said the schedule was busy and no one would be able to make it to [facility] today.  She stated she would look at the schedule in the morning and see if it could get done tomorrow.

## 2023-12-19 NOTE — NURSING
Pt returned from Cath Lab. Bedside report received from IRIS Fried.  Radial VasVad checked my RN x 2.  VasVad at 12mL. Pt AAOx4 & stated no pain.  Temp 97.6, warm blanket provided. Pt stated he was not cold, but it was cold in the procedure. Pt immediately on cell phone and texting family. Family brought to bedside.

## 2023-12-20 LAB
ANION GAP SERPL CALC-SCNC: 9 MMOL/L (ref 8–16)
ANION GAP SERPL CALC-SCNC: 9 MMOL/L (ref 8–16)
APTT PPP: 27.6 SEC (ref 21–32)
BASOPHILS # BLD AUTO: 0.06 K/UL (ref 0–0.2)
BASOPHILS NFR BLD: 0.6 % (ref 0–1.9)
BUN SERPL-MCNC: 16 MG/DL (ref 8–23)
BUN SERPL-MCNC: 16 MG/DL (ref 8–23)
CALCIUM SERPL-MCNC: 9.1 MG/DL (ref 8.7–10.5)
CALCIUM SERPL-MCNC: 9.1 MG/DL (ref 8.7–10.5)
CHLORIDE SERPL-SCNC: 105 MMOL/L (ref 95–110)
CHLORIDE SERPL-SCNC: 105 MMOL/L (ref 95–110)
CO2 SERPL-SCNC: 25 MMOL/L (ref 23–29)
CO2 SERPL-SCNC: 25 MMOL/L (ref 23–29)
CREAT SERPL-MCNC: 0.9 MG/DL (ref 0.5–1.4)
CREAT SERPL-MCNC: 0.9 MG/DL (ref 0.5–1.4)
DIFFERENTIAL METHOD BLD: ABNORMAL
EOSINOPHIL # BLD AUTO: 0.2 K/UL (ref 0–0.5)
EOSINOPHIL NFR BLD: 2.3 % (ref 0–8)
ERYTHROCYTE [DISTWIDTH] IN BLOOD BY AUTOMATED COUNT: 13.1 % (ref 11.5–14.5)
EST. GFR  (NO RACE VARIABLE): >60 ML/MIN/1.73 M^2
EST. GFR  (NO RACE VARIABLE): >60 ML/MIN/1.73 M^2
GLUCOSE SERPL-MCNC: 96 MG/DL (ref 70–110)
GLUCOSE SERPL-MCNC: 96 MG/DL (ref 70–110)
HCT VFR BLD AUTO: 41.3 % (ref 40–54)
HGB BLD-MCNC: 13.4 G/DL (ref 14–18)
IMM GRANULOCYTES # BLD AUTO: 0.03 K/UL (ref 0–0.04)
IMM GRANULOCYTES NFR BLD AUTO: 0.3 % (ref 0–0.5)
LYMPHOCYTES # BLD AUTO: 1.8 K/UL (ref 1–4.8)
LYMPHOCYTES NFR BLD: 18 % (ref 18–48)
MAGNESIUM SERPL-MCNC: 1.8 MG/DL (ref 1.6–2.6)
MCH RBC QN AUTO: 30.7 PG (ref 27–31)
MCHC RBC AUTO-ENTMCNC: 32.4 G/DL (ref 32–36)
MCV RBC AUTO: 95 FL (ref 82–98)
MONOCYTES # BLD AUTO: 1.2 K/UL (ref 0.3–1)
MONOCYTES NFR BLD: 11.8 % (ref 4–15)
NEUTROPHILS # BLD AUTO: 6.6 K/UL (ref 1.8–7.7)
NEUTROPHILS NFR BLD: 67 % (ref 38–73)
NRBC BLD-RTO: 0 /100 WBC
PHOSPHATE SERPL-MCNC: 2.6 MG/DL (ref 2.7–4.5)
PLATELET # BLD AUTO: 169 K/UL (ref 150–450)
PMV BLD AUTO: 10.4 FL (ref 9.2–12.9)
POCT GLUCOSE: 101 MG/DL (ref 70–110)
POTASSIUM SERPL-SCNC: 4.3 MMOL/L (ref 3.5–5.1)
POTASSIUM SERPL-SCNC: 4.3 MMOL/L (ref 3.5–5.1)
RBC # BLD AUTO: 4.37 M/UL (ref 4.6–6.2)
SODIUM SERPL-SCNC: 139 MMOL/L (ref 136–145)
SODIUM SERPL-SCNC: 139 MMOL/L (ref 136–145)
WBC # BLD AUTO: 9.83 K/UL (ref 3.9–12.7)

## 2023-12-20 PROCEDURE — 85025 COMPLETE CBC W/AUTO DIFF WBC: CPT | Performed by: INTERNAL MEDICINE

## 2023-12-20 PROCEDURE — 84100 ASSAY OF PHOSPHORUS: CPT | Performed by: INTERNAL MEDICINE

## 2023-12-20 PROCEDURE — 36415 COLL VENOUS BLD VENIPUNCTURE: CPT | Performed by: INTERNAL MEDICINE

## 2023-12-20 PROCEDURE — 99233 SBSQ HOSP IP/OBS HIGH 50: CPT | Mod: ,,, | Performed by: INTERNAL MEDICINE

## 2023-12-20 PROCEDURE — 25000003 PHARM REV CODE 250: Performed by: INTERNAL MEDICINE

## 2023-12-20 PROCEDURE — 83735 ASSAY OF MAGNESIUM: CPT | Performed by: INTERNAL MEDICINE

## 2023-12-20 PROCEDURE — 85730 THROMBOPLASTIN TIME PARTIAL: CPT | Performed by: INTERNAL MEDICINE

## 2023-12-20 PROCEDURE — 80048 BASIC METABOLIC PNL TOTAL CA: CPT | Performed by: INTERNAL MEDICINE

## 2023-12-20 PROCEDURE — 94761 N-INVAS EAR/PLS OXIMETRY MLT: CPT

## 2023-12-20 PROCEDURE — 21400001 HC TELEMETRY ROOM

## 2023-12-20 RX ORDER — METOPROLOL TARTRATE 25 MG/1
12.5 TABLET ORAL 2 TIMES DAILY
Status: DISCONTINUED | OUTPATIENT
Start: 2023-12-20 | End: 2023-12-21

## 2023-12-20 RX ORDER — LISINOPRIL 5 MG/1
5 TABLET ORAL DAILY
Status: DISCONTINUED | OUTPATIENT
Start: 2023-12-21 | End: 2023-12-21 | Stop reason: HOSPADM

## 2023-12-20 RX ORDER — ATORVASTATIN CALCIUM 20 MG/1
80 TABLET, FILM COATED ORAL DAILY
Status: DISCONTINUED | OUTPATIENT
Start: 2023-12-20 | End: 2023-12-21 | Stop reason: HOSPADM

## 2023-12-20 RX ORDER — ATORVASTATIN CALCIUM 20 MG/1
40 TABLET, FILM COATED ORAL DAILY
Status: DISCONTINUED | OUTPATIENT
Start: 2023-12-20 | End: 2023-12-20

## 2023-12-20 RX ADMIN — METOPROLOL TARTRATE 12.5 MG: 25 TABLET, FILM COATED ORAL at 09:12

## 2023-12-20 RX ADMIN — METOPROLOL TARTRATE 12.5 MG: 25 TABLET, FILM COATED ORAL at 08:12

## 2023-12-20 RX ADMIN — ATORVASTATIN CALCIUM 80 MG: 20 TABLET, FILM COATED ORAL at 09:12

## 2023-12-20 RX ADMIN — CLOPIDOGREL BISULFATE 75 MG: 75 TABLET ORAL at 09:12

## 2023-12-20 RX ADMIN — MUPIROCIN: 20 OINTMENT TOPICAL at 09:12

## 2023-12-20 RX ADMIN — MUPIROCIN: 20 OINTMENT TOPICAL at 08:12

## 2023-12-20 RX ADMIN — ASPIRIN 81 MG: 81 TABLET, COATED ORAL at 09:12

## 2023-12-20 NOTE — NURSING
Radial Vas Vad pressure released 2ml every five minutes. Stayed with patient throughout. No bleeding noted.  Removed device and covered with gauze and tegaderm. Pt tolerated well.

## 2023-12-20 NOTE — SUBJECTIVE & OBJECTIVE
Interval History: No acute events overnight. Seen post-cath. Feeling well. Discussed with patient and family at bedside. No other concerns at this time.    Review of Systems   Constitutional:  Negative for chills and fever.   Respiratory:  Negative for cough and shortness of breath.    Cardiovascular:  Negative for chest pain and palpitations.   Gastrointestinal:  Negative for abdominal pain, nausea and vomiting.     Objective:     Vital Signs (Most Recent):  Temp: 99 °F (37.2 °C) (12/19/23 1912)  Pulse: 74 (12/19/23 1912)  Resp: (!) 28 (12/19/23 1912)  BP: 106/70 (12/19/23 1900)  SpO2: 95 % (12/19/23 1912) Vital Signs (24h Range):  Temp:  [97.5 °F (36.4 °C)-99 °F (37.2 °C)] 99 °F (37.2 °C)  Pulse:  [52-91] 74  Resp:  [0-37] 28  SpO2:  [92 %-98 %] 95 %  BP: ()/(43-72) 106/70     Weight: 85.7 kg (188 lb 15 oz)  Body mass index is 27.11 kg/m².    Intake/Output Summary (Last 24 hours) at 12/19/2023 1932  Last data filed at 12/19/2023 1747  Gross per 24 hour   Intake 1504.82 ml   Output 1550 ml   Net -45.18 ml         Physical Exam  Vitals and nursing note reviewed.   Constitutional:       General: He is not in acute distress.     Appearance: He is well-developed.   HENT:      Head: Normocephalic and atraumatic.   Eyes:      General:         Right eye: No discharge.         Left eye: No discharge.      Conjunctiva/sclera: Conjunctivae normal.   Cardiovascular:      Rate and Rhythm: Normal rate.      Pulses: Normal pulses.   Pulmonary:      Effort: Pulmonary effort is normal. No respiratory distress.   Abdominal:      Palpations: Abdomen is soft.      Tenderness: There is no abdominal tenderness.   Musculoskeletal:         General: Normal range of motion.      Right lower leg: No edema.      Left lower leg: No edema.   Skin:     General: Skin is warm and dry.   Neurological:      Mental Status: He is alert and oriented to person, place, and time.     Significant Labs:   CBC:  Recent Labs   Lab 12/18/23  0944  12/18/23  1222 12/19/23  0505   WBC 11.87 11.74 11.68   HGB 14.8 14.5 14.0   HCT 43.8 43.0 41.1    187 173   GRAN 83.3*  9.9*  --  70.3  8.2*   LYMPH 7.8*  0.9*  --  16.0*  1.9   MONO 7.9  0.9  --  12.1  1.4*   EOS 0.0  --  0.1   BASO 0.04  --  0.05   BMP:  Recent Labs   Lab 12/18/23  0944 12/19/23  0629    141   K 4.1 4.3    105   CO2 23 25   BUN 15 15   CREATININE 1.1 1.0   * 103   CALCIUM 9.1 9.3   MG  --  1.9   PHOS  --  3.0     Recent Labs   Lab 12/18/23  0944 12/18/23  1222   TROPONINI 6.574* 44.679*     Significant Imaging: I have reviewed and interpreted all pertinent imaging results/findings within the past 24 hours.

## 2023-12-20 NOTE — PROGRESS NOTES
[facility] [place] Intensive Care [facility]  Cardiology  Progress Note    Patient Name: Francisco Spencer  MRN: [id number]  Admission Date: 12/18/2023  Hospital Length of Stay: 2 days  Code Status: Full Code   Attending Physician: JACKIE Koroma MD   Primary Care Physician: Enriqueta Gonzalez MD  Expected Discharge Date:   Principal Problem:ST elevation myocardial infarction (STEMI)    Subjective:     Brief HPI:    Francisco Spencer is a 73 y.o.male with an unremarkable past medical history. He denies risk factors for vascular disease. In early 11/2023 he began to experience chest pain when exercising like walking at a moderate pace or doing exercises at home. After a few days he had less pain with the pain only coming on if doing vigorous exercises. This continued into 12/2023. At about 5 am on 12/18/2023 he woke up from chest pain. He became mildly short of breath and began sweating. He presented to the emergency room at [place]. There was about 3 mm ST elevation in leads I and aVL. He received TNK and it was arranged for him to be transferred. As he was in the ambulance the chest pain resolved. He appears to have had a rhythm change at that time with AIVR. He was taken to [facility] for his further care. On arrival her the chest pain had resolved.        Hospital Course:     Heparin iv, aspirin and clopidogrel load.    NTG patch.    12/18/2023: Echo: Normal left ventricular size with overall mild systolic dysfunction. Mid anteroseptal and anterior walls, apical septum and apex severely hypokinetic. Remainder contract well. EF 45%. Mild diastolic dysfunction.     12/19/2023: [facility]: LAD & LCX separate ostea. LAD: Proximal 95%. Mid lengthy 80%. Calcified. D1: Osteal 95%. LCX: Mild. RCA: Dominant. Mild. LAD: Proximal RUBEN 3.0 x 15 mm to 3.2 mm. Mid PTCA 2.5 mm balloon & 2.5 mm cutting balloon. Not possible to cross with stent.    Interval History:     12/20/2023: Reviewed finding during cath and  implications with patient.    No CP or SOB.    No arrhythmias.    Feeling well.    Review of Systems   Constitutional: Negative for chills, fever and malaise/fatigue.   HENT:  Negative for nosebleeds.    Eyes:  Negative for vision loss in left eye and vision loss in right eye.   Cardiovascular:  Negative for chest pain, leg swelling, orthopnea, palpitations and paroxysmal nocturnal dyspnea.   Respiratory:  Negative for cough, hemoptysis, shortness of breath, sputum production and wheezing.    Hematologic/Lymphatic: Negative for bleeding problem. Does not bruise/bleed easily.   Skin:  Negative for color change and rash.   Musculoskeletal:  Negative for muscle weakness and myalgias.   Gastrointestinal:  Negative for abdominal pain, heartburn, hematemesis, hematochezia, melena, nausea and vomiting.   Genitourinary:  Negative for hematuria.   Neurological:  Negative for dizziness, focal weakness, headaches, light-headedness, vertigo and weakness.   Psychiatric/Behavioral:  Negative for altered mental status. The patient is not nervous/anxious.    Allergic/Immunologic: Negative for persistent infections.     Objective:     Vital Signs (Most Recent):  Temp: 98.5 °F (36.9 °C) (12/20/23 0600)  Pulse: 78 (12/20/23 0753)  Resp: (!) 22 (12/20/23 0753)  BP: 109/68 (12/20/23 0600)  SpO2: 95 % (12/20/23 0753) Vital Signs (24h Range):  Temp:  [97.9 °F (36.6 °C)-99 °F (37.2 °C)] 98.5 °F (36.9 °C)  Pulse:  [55-91] 78  Resp:  [0-37] 22  SpO2:  [93 %-98 %] 95 %  BP: ()/(50-72) 109/68     Weight: 85.7 kg (188 lb 15 oz)  Body mass index is 27.11 kg/m².    SpO2: 95 %         Intake/Output Summary (Last 24 hours) at 12/20/2023 0815  Last data filed at 12/20/2023 0600  Gross per 24 hour   Intake 1191.24 ml   Output 1600 ml   Net -408.76 ml         Lines/Drains/Airways       Peripheral Intravenous Line  Duration                  Peripheral IV - Single Lumen 12/18/23 0912 18 G Distal;Left;Posterior Forearm 1 day         Peripheral IV -  Single Lumen 12/18/23 0912 18 G Posterior;Right Hand 1 day         Peripheral IV - Single Lumen 12/18/23 0945 18 G Anterior;Distal;Left Upper Arm 1 day                    Physical Exam  Constitutional:       General: He is not in acute distress.     Appearance: Normal appearance. He is well-developed. He is not ill-appearing.   Eyes:      Conjunctiva/sclera:      Right eye: Right conjunctiva is not injected. No hemorrhage.     Left eye: Left conjunctiva is not injected. No hemorrhage.     Pupils:      Right eye: Pupil is round.      Left eye: Pupil is round.   Neck:      Vascular: No JVD.   Cardiovascular:      Rate and Rhythm: Normal rate and regular rhythm.      Pulses:           Radial pulses are 2+ on the right side.      Heart sounds: S1 normal and S2 normal.   Pulmonary:      Effort: Pulmonary effort is normal.      Breath sounds: Normal breath sounds.   Chest:      Chest wall: No swelling or tenderness.   Abdominal:      General: There is no distension.      Palpations: Abdomen is soft.      Tenderness: There is no abdominal tenderness.   Musculoskeletal:      Cervical back: Neck supple.      Right ankle: No swelling.      Left ankle: No swelling.   Skin:     General: Skin is warm and dry.      Findings: No rash.   Neurological:      Mental Status: He is alert and oriented to person, place, and time. He is not disoriented.   Psychiatric:         Attention and Perception: Attention normal.         Mood and Affect: Mood normal.         Speech: Speech normal.         Behavior: Behavior normal.         Thought Content: Thought content normal.         Cognition and Memory: Cognition and memory normal.         Judgment: Judgment normal.         Current Medications:     aspirin  81 mg Oral Daily    atorvastatin  80 mg Oral Daily    clopidogreL  75 mg Oral Daily    metoprolol tartrate  12.5 mg Oral BID    mupirocin   Nasal BID     Current Laboratory Results:    Recent Results (from the past 24 hour(s))   ISTAT ACT-K     Collection Time: 12/19/23  8:28 AM   Result Value Ref Range    POC ACTIVATED CLOTTING TIME K 277 (H) 74 - 137 sec    Sample unknown    POCT glucose    Collection Time: 12/19/23  9:13 PM   Result Value Ref Range    POCT Glucose 101 70 - 110 mg/dL   CBC auto differential    Collection Time: 12/19/23 11:29 PM   Result Value Ref Range    WBC 9.73 3.90 - 12.70 K/uL    RBC 4.28 (L) 4.60 - 6.20 M/uL    Hemoglobin 13.4 (L) 14.0 - 18.0 g/dL    Hematocrit 40.1 40.0 - 54.0 %    MCV 94 82 - 98 fL    MCH 31.3 (H) 27.0 - 31.0 pg    MCHC 33.4 32.0 - 36.0 g/dL    RDW 13.3 11.5 - 14.5 %    Platelets 154 150 - 450 K/uL    MPV 10.2 9.2 - 12.9 fL    Immature Granulocytes 0.3 0.0 - 0.5 %    Gran # (ANC) 6.4 1.8 - 7.7 K/uL    Immature Grans (Abs) 0.03 0.00 - 0.04 K/uL    Lymph # 1.8 1.0 - 4.8 K/uL    Mono # 1.2 (H) 0.3 - 1.0 K/uL    Eos # 0.2 0.0 - 0.5 K/uL    Baso # 0.07 0.00 - 0.20 K/uL    nRBC 0 0 /100 WBC    Gran % 66.1 38.0 - 73.0 %    Lymph % 18.4 18.0 - 48.0 %    Mono % 12.3 4.0 - 15.0 %    Eosinophil % 2.2 0.0 - 8.0 %    Basophil % 0.7 0.0 - 1.9 %    Differential Method Automated    APTT    Collection Time: 12/20/23  4:31 AM   Result Value Ref Range    aPTT 27.6 21.0 - 32.0 sec   CBC auto differential    Collection Time: 12/20/23  4:31 AM   Result Value Ref Range    WBC 9.83 3.90 - 12.70 K/uL    RBC 4.37 (L) 4.60 - 6.20 M/uL    Hemoglobin 13.4 (L) 14.0 - 18.0 g/dL    Hematocrit 41.3 40.0 - 54.0 %    MCV 95 82 - 98 fL    MCH 30.7 27.0 - 31.0 pg    MCHC 32.4 32.0 - 36.0 g/dL    RDW 13.1 11.5 - 14.5 %    Platelets 169 150 - 450 K/uL    MPV 10.4 9.2 - 12.9 fL    Immature Granulocytes 0.3 0.0 - 0.5 %    Gran # (ANC) 6.6 1.8 - 7.7 K/uL    Immature Grans (Abs) 0.03 0.00 - 0.04 K/uL    Lymph # 1.8 1.0 - 4.8 K/uL    Mono # 1.2 (H) 0.3 - 1.0 K/uL    Eos # 0.2 0.0 - 0.5 K/uL    Baso # 0.06 0.00 - 0.20 K/uL    nRBC 0 0 /100 WBC    Gran % 67.0 38.0 - 73.0 %    Lymph % 18.0 18.0 - 48.0 %    Mono % 11.8 4.0 - 15.0 %    Eosinophil % 2.3 0.0 -  8.0 %    Basophil % 0.6 0.0 - 1.9 %    Differential Method Automated    Basic Metabolic Panel    Collection Time: 12/20/23  4:31 AM   Result Value Ref Range    Sodium 139 136 - 145 mmol/L    Potassium 4.3 3.5 - 5.1 mmol/L    Chloride 105 95 - 110 mmol/L    CO2 25 23 - 29 mmol/L    Glucose 96 70 - 110 mg/dL    BUN 16 8 - 23 mg/dL    Creatinine 0.9 0.5 - 1.4 mg/dL    Calcium 9.1 8.7 - 10.5 mg/dL    Anion Gap 9 8 - 16 mmol/L    eGFR >60 >60 mL/min/1.73 m^2   Magnesium    Collection Time: 12/20/23  4:31 AM   Result Value Ref Range    Magnesium 1.8 1.6 - 2.6 mg/dL   Phosphorus    Collection Time: 12/20/23  4:31 AM   Result Value Ref Range    Phosphorus 2.6 (L) 2.7 - 4.5 mg/dL   Basic metabolic panel    Collection Time: 12/20/23  4:31 AM   Result Value Ref Range    Sodium 139 136 - 145 mmol/L    Potassium 4.3 3.5 - 5.1 mmol/L    Chloride 105 95 - 110 mmol/L    CO2 25 23 - 29 mmol/L    Glucose 96 70 - 110 mg/dL    BUN 16 8 - 23 mg/dL    Creatinine 0.9 0.5 - 1.4 mg/dL    Calcium 9.1 8.7 - 10.5 mg/dL    Anion Gap 9 8 - 16 mmol/L    eGFR >60 >60 mL/min/1.73 m^2     Current Imaging Results:    No orders to display       12/18/2023: Echo:  Left Ventricle: The left ventricle is normal in size. Ventricular mass is normal. Normal wall thickness. Regional wall motion abnormalities present. The mid anteroseptal and anterior walls, the apical septum and apex are severely hypokinetic. The remainder of the left ventricular walls contract well. There is mildly reduced systolic function. Ejection fraction by visual approximation is 45%. Grade I diastolic dysfunction. Normal left ventricular filling pressure. Tissue Doppler velocity is reduced. Average E/e' ratio is 9.50.  Right Ventricle: Normal right ventricular cavity size. Wall thickness is normal. Right ventricle wall motion  is normal. Systolic function is normal.  Left Atrium: Left atrium is mildly dilated.  IVC/SVC: Normal venous pressure at 3 mmHg.        Assessment and Plan:      Problem List:    Active Diagnoses:    Diagnosis Date Noted POA    PRINCIPAL PROBLEM:  ST elevation myocardial infarction (STEMI) [I21.3] 12/18/2023 Yes    Coronary artery disease [I25.10] 12/18/2023 Yes    History of coronary artery stent placement [Z95.5] 12/19/2023 Not Applicable      Problems Resolved During this Admission:     Assessment and Plan:    Coronary Artery Disease              11/2023: Began to experience angina.  12/18/2023: 5 AM: Chest pain at rest. Lateral ST elevation. Received TNK. CP resolved. AIVR. Troponin 45.  12/18/2023: Echo: Normal left ventricular size with overall mild systolic dysfunction. Mid anteroseptal and anterior walls, apical septum and apex severely hypokinetic. Remainder contract well. EF 45%. Mild diastolic dysfunction.   12/19/2023: [facility]: LAD & LCX separate ostea. LAD: Proximal 95%. Mid lengthy 80%. Calcified. D1: Osteal 95%. LCX: Mild. RCA: Dominant. Mild. LAD: Proximal RUBEN 3.0 x 15 mm to 3.2 mm. Mid PTCA 2.5 mm balloon & 2.5 mm cutting balloon. Not possible to cross with stent.  12/20/2023: Reviewed finding during cath and implications with patient.  12/18/2023: Chol 176. HDL 36. . .  On atorvastatin 40 mg Q24.   On aspirin 81 mg Q24.  On clopidogrel 75 mg Q24 for 12 months to 12/31/2024.  On metoprolol.  ACEI to begin.  Ambulation today.      VTE Risk Mitigation (From admission, onward)           Ordered     heparin 25,000 units in dextrose 5% 250 mL (100 units/mL) infusion LOW INTENSITY nomogram - [facility]  Continuous        Question:  Begin at (units/kg/hr)  Answer:  12    12/18/23 1218     IP VTE HIGH RISK PATIENT  Once         12/18/23 1003     Place sequential compression device  Until discontinued         12/18/23 1003                    Grace Panda MD  Cardiology  [facility] - Intensive Care ([place])

## 2023-12-20 NOTE — PROGRESS NOTES
[facility] - Intensive Care [facility] Medicine  Progress Note    Patient Name: Francisco Spenecr  MRN: [id number]  Patient Class: IP- Inpatient   Admission Date: 12/18/2023  Length of Stay: 2 days  Attending Physician: JACKIE Koroma MD  Primary Care Provider: Enriqueta Gonzalez MD        Subjective:     Principal Problem:ST elevation myocardial infarction (STEMI)        HPI:  Mr. Spencer is a 73/M with no significant PMH who presented to [facility] as a transfer from [facility] ED with a one day history of chest discomfort, nausea, and diaphoresis worsened with activity. He reports symptoms have been off and on over the past month and a half; had started taking glutamine as a supplement and noted after initiation episodes of intermittent chest burning. Stopped taking glutamine after one week and symptoms improved some but noted intermittent recurring chest burning worse with exertion. This gradually worsened over the following weeks with more frequent episodes caused with less exertion until the morning of presentation he noted significant chest burning radiating to bilateral arms, diaphoresis, and nausea as well as shortness of breath. Symptoms were severe enough he decided to contact 911. He notes mild sinus congestion and cough over the past week but did not have significant fevers, chills, palpitations, abdominal pain, or vomiting. Upon arrival to [facility] ED he was noted to have lateral ST elevations on EKG and troponin of 9.85. He received aspirin PO and tenecteplase as well as nitroglycerin SL and ointment topically. He noted improvement in his symptoms with these treatments but reports mild burning noted to chest at time of evaluation. He was subsequently transferred to [facility] for cardiology evaluation for catheterization. Cardiology consulted in ED and recommended heparin gtt, aspirin, clopidogrel, and LHC for 12/19. Hospital medicine was contacted for admission.    Overview/Hospital  Course:  No notes on file    Interval History: No acute events overnight. Doing well this morning - discussed at bedside with patient / Dr. Panda. No other concerns at this time.    Review of Systems   Constitutional:  Negative for chills and fever.   Respiratory:  Negative for cough and shortness of breath.    Cardiovascular:  Negative for chest pain and palpitations.   Gastrointestinal:  Negative for abdominal pain, nausea and vomiting.     Objective:     Vital Signs (Most Recent):  Temp: 98.4 °F (36.9 °C) (12/20/23 1115)  Pulse: 78 (12/20/23 1430)  Resp: (!) 34 (12/20/23 1430)  BP: 109/72 (12/20/23 1100)  SpO2: (!) 94 % (12/20/23 1430) Vital Signs (24h Range):  Temp:  [98.4 °F (36.9 °C)-99 °F (37.2 °C)] 98.4 °F (36.9 °C)  Pulse:  [64-96] 78  Resp:  [0-53] 34  SpO2:  [89 %-98 %] 94 %  BP: ()/(53-77) 109/72     Weight: 85.7 kg (188 lb 15 oz)  Body mass index is 27.11 kg/m².    Intake/Output Summary (Last 24 hours) at 12/20/2023 1639  Last data filed at 12/20/2023 1505  Gross per 24 hour   Intake 1020 ml   Output 2375 ml   Net -1355 ml           Physical Exam  Vitals and nursing note reviewed.   Constitutional:       General: He is not in acute distress.     Appearance: He is well-developed.   HENT:      Head: Normocephalic and atraumatic.   Eyes:      General:         Right eye: No discharge.         Left eye: No discharge.      Conjunctiva/sclera: Conjunctivae normal.   Cardiovascular:      Rate and Rhythm: Normal rate.      Pulses: Normal pulses.   Pulmonary:      Effort: Pulmonary effort is normal. No respiratory distress.   Abdominal:      Palpations: Abdomen is soft.      Tenderness: There is no abdominal tenderness.   Musculoskeletal:         General: Normal range of motion.      Right lower leg: No edema.      Left lower leg: No edema.   Skin:     General: Skin is warm and dry.   Neurological:      Mental Status: He is alert and oriented to person, place, and time.     Significant Labs:    CBC:  Recent Labs   Lab 12/19/23  0505 12/19/23  2329 12/20/23  0431   WBC 11.68 9.73 9.83   HGB 14.0 13.4* 13.4*   HCT 41.1 40.1 41.3    154 169   GRAN 70.3  8.2* 66.1  6.4 67.0  6.6   LYMPH 16.0*  1.9 18.4  1.8 18.0  1.8   MONO 12.1  1.4* 12.3  1.2* 11.8  1.2*   EOS 0.1 0.2 0.2   BASO 0.05 0.07 0.06     BMP:  Recent Labs   Lab 12/18/23  0944 12/19/23  0629 12/20/23  0431    141 139  139   K 4.1 4.3 4.3  4.3    105 105  105   CO2 23 25 25  25   BUN 15 15 16  16   CREATININE 1.1 1.0 0.9  0.9   * 103 96  96   CALCIUM 9.1 9.3 9.1  9.1   MG  --  1.9 1.8   PHOS  --  3.0 2.6*       Recent Labs   Lab 12/18/23  0944 12/18/23  1222   TROPONINI 6.574* 44.679*       Significant Imaging: I have reviewed and interpreted all pertinent imaging results/findings within the past 24 hours.      Assessment/Plan:      * ST elevation myocardial infarction (STEMI)  CAD  - Reports extensive family history of CAD and MI resulting in death of his brother at age 55.  - Continue aspirin 81mg PO daily, clopidogrel 75mg PO daily. .8. Continue atorvastatin at 40mg PO daily. Start metoprolol 12.5mg PO BID, lisinopril 5mg PO daily given echo showing EF 45%, mild diastolic dysfunction. LHC showed proximal 95% LAD stenosis, mid 80%. RUBEN placed to proximal LAD and mid underwent balloon angioplasty.  - Cardiology consulted; appreciate assistance.      VTE Risk Mitigation (From admission, onward)           Ordered     heparin 25,000 units in dextrose 5% 250 mL (100 units/mL) infusion LOW INTENSITY nomogram - [facility]  Continuous        Question:  Begin at (units/kg/hr)  Answer:  12    12/18/23 1218     IP VTE HIGH RISK PATIENT  Once         12/18/23 1003     Place sequential compression device  Until discontinued         12/18/23 1003                    Discharge Planning   DANNIE:      Code Status: Full Code   Is the patient medically ready for discharge?:     Reason for patient still in hospital (select all  that apply): Treatment  Discharge Plan A: Home                  D Tyron Koroma MD  Department of Hospital Medicine   [facility] - Intensive Care ([place])

## 2023-12-20 NOTE — PLAN OF CARE
Medicare Message     Important Message from Medicare regarding Discharge Appeal Rights Given to patient/caregiver; Explained to patient/caregiver; Signed/date by patient/caregiver   Date IMM was signed 12/20/2023   Time IMM was signed 0946

## 2023-12-20 NOTE — NURSING
Ambulated with patient through the unit 3x.  No signs of distress nor dizziness.  Pt asking about a workout room or treadmill.  Returned to room and monitors all WDL.

## 2023-12-20 NOTE — NURSING
"Ambulated with patient through the unit x2.  No signs of distress noted, nor dizziness reported.  Pt eager to get back to doing his "5 [place]" every day.  Encouraged to perform activity at slow pace to begin once discharged.  Returned to room.  All VS WDL post ambulation.  "

## 2023-12-20 NOTE — SUBJECTIVE & OBJECTIVE
Interval History: No acute events overnight. Doing well this morning - discussed at bedside with patient / Dr. Panda. No other concerns at this time.    Review of Systems   Constitutional:  Negative for chills and fever.   Respiratory:  Negative for cough and shortness of breath.    Cardiovascular:  Negative for chest pain and palpitations.   Gastrointestinal:  Negative for abdominal pain, nausea and vomiting.     Objective:     Vital Signs (Most Recent):  Temp: 98.4 °F (36.9 °C) (12/20/23 1115)  Pulse: 78 (12/20/23 1430)  Resp: (!) 34 (12/20/23 1430)  BP: 109/72 (12/20/23 1100)  SpO2: (!) 94 % (12/20/23 1430) Vital Signs (24h Range):  Temp:  [98.4 °F (36.9 °C)-99 °F (37.2 °C)] 98.4 °F (36.9 °C)  Pulse:  [64-96] 78  Resp:  [0-53] 34  SpO2:  [89 %-98 %] 94 %  BP: ()/(53-77) 109/72     Weight: 85.7 kg (188 lb 15 oz)  Body mass index is 27.11 kg/m².    Intake/Output Summary (Last 24 hours) at 12/20/2023 1639  Last data filed at 12/20/2023 1505  Gross per 24 hour   Intake 1020 ml   Output 2375 ml   Net -1355 ml           Physical Exam  Vitals and nursing note reviewed.   Constitutional:       General: He is not in acute distress.     Appearance: He is well-developed.   HENT:      Head: Normocephalic and atraumatic.   Eyes:      General:         Right eye: No discharge.         Left eye: No discharge.      Conjunctiva/sclera: Conjunctivae normal.   Cardiovascular:      Rate and Rhythm: Normal rate.      Pulses: Normal pulses.   Pulmonary:      Effort: Pulmonary effort is normal. No respiratory distress.   Abdominal:      Palpations: Abdomen is soft.      Tenderness: There is no abdominal tenderness.   Musculoskeletal:         General: Normal range of motion.      Right lower leg: No edema.      Left lower leg: No edema.   Skin:     General: Skin is warm and dry.   Neurological:      Mental Status: He is alert and oriented to person, place, and time.     Significant Labs:   CBC:  Recent Labs   Lab 12/19/23  0505  12/19/23  2329 12/20/23  0431   WBC 11.68 9.73 9.83   HGB 14.0 13.4* 13.4*   HCT 41.1 40.1 41.3    154 169   GRAN 70.3  8.2* 66.1  6.4 67.0  6.6   LYMPH 16.0*  1.9 18.4  1.8 18.0  1.8   MONO 12.1  1.4* 12.3  1.2* 11.8  1.2*   EOS 0.1 0.2 0.2   BASO 0.05 0.07 0.06     BMP:  Recent Labs   Lab 12/18/23  0944 12/19/23  0629 12/20/23  0431    141 139  139   K 4.1 4.3 4.3  4.3    105 105  105   CO2 23 25 25  25   BUN 15 15 16  16   CREATININE 1.1 1.0 0.9  0.9   * 103 96  96   CALCIUM 9.1 9.3 9.1  9.1   MG  --  1.9 1.8   PHOS  --  3.0 2.6*       Recent Labs   Lab 12/18/23  0944 12/18/23  1222   TROPONINI 6.574* 44.679*       Significant Imaging: I have reviewed and interpreted all pertinent imaging results/findings within the past 24 hours.

## 2023-12-20 NOTE — PLAN OF CARE
MSW met with the patient at the bedside.     Patient is alert and oriented with no communication barriers.     Patient has no DME in the home.     Patients PCP is correct on the face sheet. Patient choice pharmacy is bedside delivery.     Patients' family will transport the patient home at discharge.     CM team will continue to follow.      12/20/23 0857   Discharge Assessment   Assessment Type Discharge Planning Assessment   Confirmed/corrected address, phone number and insurance Yes   Confirmed Demographics Correct on Facesheet   Source of Information patient;health record   People in Home alone   Do you expect to return to your current living situation? Yes   Do you have help at home or someone to help you manage your care at home? Yes   Who are your caregiver(s) and their phone number(s)? Family   Prior to hospitilization cognitive status: Alert/Oriented   Current cognitive status: Alert/Oriented   Walking or Climbing Stairs Difficulty no   Dressing/Bathing Difficulty no   Equipment Currently Used at Home none   Readmission within 30 days? No   Patient currently being followed by outpatient case management? No   Do you currently have service(s) that help you manage your care at home? No   Do you take prescription medications? No   Do you have prescription coverage? Yes   Do you have any problems affording any of your prescribed medications? No   Is the patient taking medications as prescribed? yes   How do you get to doctors appointments? car, drives self;family or friend will provide   Are you on dialysis? No   Do you take coumadin? No   Discharge Plan A Home   Discharge Plan B Home with family   DME Needed Upon Discharge  none   Discharge Plan discussed with: Patient   Transition of Care Barriers None   Physical Activity   On average, how many days per week do you engage in moderate to strenuous exercise (like a brisk walk)? 4 days   On average, how many minutes do you engage in exercise at this level? 30 min    Financial Resource Strain   How hard is it for you to pay for the very basics like food, housing, medical care, and heating? Not hard   Housing Stability   In the last 12 months, was there a time when you were not able to pay the mortgage or rent on time? N   In the last 12 months, was there a time when you did not have a steady place to sleep or slept in a shelter (including now)? N   Transportation Needs   In the past 12 months, has lack of transportation kept you from medical appointments or from getting medications? no   In the past 12 months, has lack of transportation kept you from meetings, work, or from getting things needed for daily living? No   Food Insecurity   Within the past 12 months, you worried that your food would run out before you got the money to buy more. Never true   Within the past 12 months, the food you bought just didn't last and you didn't have money to get more. Never true   Stress   Do you feel stress - tense, restless, nervous, or anxious, or unable to sleep at night because your mind is troubled all the time - these days? Very much   Social Connections   In a typical week, how many times do you talk on the phone with family, friends, or neighbors? Three   How often do you get together with friends or relatives? Three times   How often do you attend Episcopal or Jain services? Never   Do you belong to any clubs or organizations such as Episcopal groups, unions, fraternal or athletic groups, or school groups? No   How often do you attend meetings of the clubs or organizations you belong to? Never   Are you , , , , never , or living with a partner? Never marrie

## 2023-12-20 NOTE — ASSESSMENT & PLAN NOTE
CAD  - Reports extensive family history of CAD and MI resulting in death of his brother at age 55.  - Continue aspirin 81mg PO daily, clopidogrel 75mg PO daily. .8. Continue atorvastatin at 40mg PO daily. Start metoprolol 12.5mg PO BID, lisinopril 5mg PO daily given echo showing EF 45%, mild diastolic dysfunction. LHC showed proximal 95% LAD stenosis, mid 80%. RUBEN placed to proximal LAD and mid underwent balloon angioplasty.  - Cardiology consulted; appreciate assistance.

## 2023-12-20 NOTE — ASSESSMENT & PLAN NOTE
CAD  - EKG unavailable for review from [facility] ED at time of H&P but reportedly lateral ST elevations. EKG here with sinus bradycardia, nonspecific ST-T changes. Troponin 9.85 from [facility] records, 6.57 here.  - Reports extensive family history of CAD and MI resulting in death of his brother at age 55.  - Continue aspirin 81mg PO daily, clopidogrel 75mg PO daily. .8. Started atorvastatin 80mg PO daily. Continue nitroglycerin 0.2mg/hr patch transdermal daily. To [facility] this morning.  - HgbA1c 5.1.  - Cardiology consulted; appreciate assistance.

## 2023-12-21 VITALS
SYSTOLIC BLOOD PRESSURE: 125 MMHG | RESPIRATION RATE: 20 BRPM | WEIGHT: 188.94 LBS | OXYGEN SATURATION: 96 % | DIASTOLIC BLOOD PRESSURE: 83 MMHG | BODY MASS INDEX: 27.05 KG/M2 | TEMPERATURE: 98 F | HEART RATE: 90 BPM | HEIGHT: 70 IN

## 2023-12-21 LAB
ANION GAP SERPL CALC-SCNC: 10 MMOL/L (ref 8–16)
APTT PPP: 27.4 SEC (ref 21–32)
BASOPHILS # BLD AUTO: 0.06 K/UL (ref 0–0.2)
BASOPHILS NFR BLD: 0.7 % (ref 0–1.9)
BUN SERPL-MCNC: 17 MG/DL (ref 8–23)
CALCIUM SERPL-MCNC: 9.3 MG/DL (ref 8.7–10.5)
CHLORIDE SERPL-SCNC: 105 MMOL/L (ref 95–110)
CO2 SERPL-SCNC: 24 MMOL/L (ref 23–29)
CREAT SERPL-MCNC: 0.9 MG/DL (ref 0.5–1.4)
DIFFERENTIAL METHOD BLD: ABNORMAL
EOSINOPHIL # BLD AUTO: 0.3 K/UL (ref 0–0.5)
EOSINOPHIL NFR BLD: 3.1 % (ref 0–8)
ERYTHROCYTE [DISTWIDTH] IN BLOOD BY AUTOMATED COUNT: 12.8 % (ref 11.5–14.5)
EST. GFR  (NO RACE VARIABLE): >60 ML/MIN/1.73 M^2
GLUCOSE SERPL-MCNC: 98 MG/DL (ref 70–110)
HCT VFR BLD AUTO: 42.4 % (ref 40–54)
HGB BLD-MCNC: 14 G/DL (ref 14–18)
IMM GRANULOCYTES # BLD AUTO: 0.04 K/UL (ref 0–0.04)
IMM GRANULOCYTES NFR BLD AUTO: 0.4 % (ref 0–0.5)
LYMPHOCYTES # BLD AUTO: 1.7 K/UL (ref 1–4.8)
LYMPHOCYTES NFR BLD: 18.8 % (ref 18–48)
MAGNESIUM SERPL-MCNC: 1.9 MG/DL (ref 1.6–2.6)
MCH RBC QN AUTO: 30.8 PG (ref 27–31)
MCHC RBC AUTO-ENTMCNC: 33 G/DL (ref 32–36)
MCV RBC AUTO: 93 FL (ref 82–98)
MONOCYTES # BLD AUTO: 1.2 K/UL (ref 0.3–1)
MONOCYTES NFR BLD: 12.8 % (ref 4–15)
NEUTROPHILS # BLD AUTO: 5.8 K/UL (ref 1.8–7.7)
NEUTROPHILS NFR BLD: 64.2 % (ref 38–73)
NRBC BLD-RTO: 0 /100 WBC
PHOSPHATE SERPL-MCNC: 3.4 MG/DL (ref 2.7–4.5)
PLATELET # BLD AUTO: 177 K/UL (ref 150–450)
PMV BLD AUTO: 10.3 FL (ref 9.2–12.9)
POTASSIUM SERPL-SCNC: 4.1 MMOL/L (ref 3.5–5.1)
RBC # BLD AUTO: 4.55 M/UL (ref 4.6–6.2)
SODIUM SERPL-SCNC: 139 MMOL/L (ref 136–145)
WBC # BLD AUTO: 9.04 K/UL (ref 3.9–12.7)

## 2023-12-21 PROCEDURE — 83735 ASSAY OF MAGNESIUM: CPT | Performed by: INTERNAL MEDICINE

## 2023-12-21 PROCEDURE — 36415 COLL VENOUS BLD VENIPUNCTURE: CPT | Performed by: INTERNAL MEDICINE

## 2023-12-21 PROCEDURE — 84100 ASSAY OF PHOSPHORUS: CPT | Performed by: INTERNAL MEDICINE

## 2023-12-21 PROCEDURE — 99233 SBSQ HOSP IP/OBS HIGH 50: CPT | Mod: ,,, | Performed by: INTERNAL MEDICINE

## 2023-12-21 PROCEDURE — 80048 BASIC METABOLIC PNL TOTAL CA: CPT | Performed by: INTERNAL MEDICINE

## 2023-12-21 PROCEDURE — 85730 THROMBOPLASTIN TIME PARTIAL: CPT | Performed by: INTERNAL MEDICINE

## 2023-12-21 PROCEDURE — 25000003 PHARM REV CODE 250: Performed by: INTERNAL MEDICINE

## 2023-12-21 PROCEDURE — 85025 COMPLETE CBC W/AUTO DIFF WBC: CPT | Performed by: INTERNAL MEDICINE

## 2023-12-21 RX ORDER — METOPROLOL SUCCINATE 50 MG/1
50 TABLET, EXTENDED RELEASE ORAL DAILY
Status: DISCONTINUED | OUTPATIENT
Start: 2023-12-21 | End: 2023-12-21 | Stop reason: HOSPADM

## 2023-12-21 RX ORDER — ATORVASTATIN CALCIUM 80 MG/1
80 TABLET, FILM COATED ORAL DAILY
Qty: 30 TABLET | Refills: 1 | Status: SHIPPED | OUTPATIENT
Start: 2023-12-22 | End: 2024-01-22

## 2023-12-21 RX ORDER — METOPROLOL SUCCINATE 50 MG/1
50 TABLET, EXTENDED RELEASE ORAL DAILY
Qty: 30 TABLET | Refills: 1 | Status: SHIPPED | OUTPATIENT
Start: 2023-12-22 | End: 2024-01-22 | Stop reason: SDUPTHER

## 2023-12-21 RX ORDER — LISINOPRIL 5 MG/1
5 TABLET ORAL DAILY
Qty: 30 TABLET | Refills: 1 | Status: SHIPPED | OUTPATIENT
Start: 2023-12-21 | End: 2024-01-22 | Stop reason: SDUPTHER

## 2023-12-21 RX ORDER — CLOPIDOGREL BISULFATE 75 MG/1
75 TABLET ORAL DAILY
Qty: 30 TABLET | Refills: 1 | Status: SHIPPED | OUTPATIENT
Start: 2023-12-22 | End: 2024-01-22 | Stop reason: SDUPTHER

## 2023-12-21 RX ORDER — ASPIRIN 81 MG/1
81 TABLET ORAL DAILY
Qty: 30 TABLET | Refills: 1 | Status: SHIPPED | OUTPATIENT
Start: 2023-12-22 | End: 2024-01-22 | Stop reason: SDUPTHER

## 2023-12-21 RX ADMIN — MUPIROCIN: 20 OINTMENT TOPICAL at 09:12

## 2023-12-21 RX ADMIN — ASPIRIN 81 MG: 81 TABLET, COATED ORAL at 08:12

## 2023-12-21 RX ADMIN — ATORVASTATIN CALCIUM 80 MG: 20 TABLET, FILM COATED ORAL at 08:12

## 2023-12-21 RX ADMIN — METOPROLOL SUCCINATE 50 MG: 50 TABLET, EXTENDED RELEASE ORAL at 08:12

## 2023-12-21 RX ADMIN — CLOPIDOGREL BISULFATE 75 MG: 75 TABLET ORAL at 08:12

## 2023-12-21 NOTE — PLAN OF CARE
12/21/23 1034   Final Note   Assessment Type Final Discharge Note   Anticipated Discharge Disposition Home   Hospital Resources/Appts/Education Provided Provided patient/caregiver with written discharge plan information;Appointments scheduled and added to AVS   Post-Acute Status   Discharge Delays None known at this time     Pt states he lives independently at home.     Family to provide transportation home.    No DC needs from CM perspective.

## 2023-12-21 NOTE — PROGRESS NOTES
[facility] Intensive Care [facility]  Cardiology  Progress Note    Patient Name: Francisco Spencer  MRN: [id number]  Admission Date: 12/18/2023  Hospital Length of Stay: 3 days  Code Status: Full Code   Attending Physician: JACKIE Koroma MD   Primary Care Physician: Enriqueta Gonzalez MD  Expected Discharge Date:   Principal Problem:ST elevation myocardial infarction (STEMI)    Subjective:     Brief HPI:    Francisco Spencer is a 73 y.o.male with an unremarkable past medical history. He denies risk factors for vascular disease. In early 11/2023 he began to experience chest pain when exercising like walking at a moderate pace or doing exercises at home. After a few days he had less pain with the pain only coming on if doing vigorous exercises. This continued into 12/2023. At about 5 am on 12/18/2023 he woke up from chest pain. He became mildly short of breath and began sweating. He presented to the emergency room at [place]. There was about 3 mm ST elevation in leads I and aVL. He received TNK and it was arranged for him to be transferred. As he was in the ambulance the chest pain resolved. He appears to have had a rhythm change at that time with AIVR. He was taken to [facility] for his further care. On arrival her the chest pain had resolved.        Hospital Course:     Heparin iv, aspirin and clopidogrel load.    NTG patch.    12/18/2023: Echo: Normal left ventricular size with overall mild systolic dysfunction. Mid anteroseptal and anterior walls, apical septum and apex severely hypokinetic. Remainder contract well. EF 45%. Mild diastolic dysfunction.     12/19/2023: [facility]BC: LAD & LCX separate ostea. LAD: Proximal 95%. Mid lengthy 80%. Calcified. D1: Osteal 95%. LCX: Mild. RCA: Dominant. Mild. LAD: Proximal RUBEN 3.0 x 15 mm to 3.2 mm. Mid PTCA 2.5 mm balloon & 2.5 mm cutting balloon. Not possible to cross with stent.    12/20/2023: Reviewed finding during cath and implications with  patient.    Interval History:     No CP or SOB.    Feeling well.    Concerned he needs to take pills - stressed the absolute critical importance of 100% compliance with DAPT.    Review of Systems   Constitutional: Negative for chills, fever and malaise/fatigue.   HENT:  Negative for nosebleeds.    Eyes:  Negative for vision loss in left eye and vision loss in right eye.   Cardiovascular:  Negative for chest pain, leg swelling, orthopnea, palpitations and paroxysmal nocturnal dyspnea.   Respiratory:  Negative for cough, hemoptysis, shortness of breath, sputum production and wheezing.    Hematologic/Lymphatic: Negative for bleeding problem. Does not bruise/bleed easily.   Skin:  Negative for color change and rash.   Musculoskeletal:  Negative for muscle weakness and myalgias.   Gastrointestinal:  Negative for abdominal pain, heartburn, hematemesis, hematochezia, melena, nausea and vomiting.   Genitourinary:  Negative for hematuria.   Neurological:  Negative for dizziness, focal weakness, headaches, light-headedness, vertigo and weakness.   Psychiatric/Behavioral:  Negative for altered mental status. The patient is not nervous/anxious.    Allergic/Immunologic: Negative for persistent infections.     Objective:     Vital Signs (Most Recent):  Temp: 99.3 °F (37.4 °C) (12/21/23 0400)  Pulse: 80 (12/21/23 0400)  Resp: 17 (12/20/23 2350)  BP: 102/66 (12/21/23 0400)  SpO2: 95 % (12/21/23 0400) Vital Signs (24h Range):  Temp:  [97.9 °F (36.6 °C)-99.3 °F (37.4 °C)] 99.3 °F (37.4 °C)  Pulse:  [65-96] 80  Resp:  [13-53] 17  SpO2:  [89 %-98 %] 95 %  BP: ()/(66-77) 102/66     Weight: 85.7 kg (188 lb 15 oz)  Body mass index is 27.11 kg/m².    SpO2: 95 %         Intake/Output Summary (Last 24 hours) at 12/21/2023 0700  Last data filed at 12/21/2023 0404  Gross per 24 hour   Intake 660 ml   Output 2350 ml   Net -1690 ml         Lines/Drains/Airways       Peripheral Intravenous Line  Duration                  Peripheral IV -  Single Lumen 12/18/23 0912 18 G Distal;Left;Posterior Forearm 2 days                    Physical Exam  Constitutional:       General: He is not in acute distress.     Appearance: Normal appearance. He is well-developed. He is not ill-appearing.   Eyes:      Conjunctiva/sclera:      Right eye: Right conjunctiva is not injected. No hemorrhage.     Left eye: Left conjunctiva is not injected. No hemorrhage.     Pupils:      Right eye: Pupil is round.      Left eye: Pupil is round.   Neck:      Vascular: No JVD.   Cardiovascular:      Rate and Rhythm: Regular rhythm. Tachycardia present.      Pulses:           Radial pulses are 2+ on the right side.      Heart sounds: S1 normal and S2 normal.   Pulmonary:      Effort: Pulmonary effort is normal.      Breath sounds: Normal breath sounds.   Chest:      Chest wall: No swelling or tenderness.   Abdominal:      General: There is no distension.      Palpations: Abdomen is soft.      Tenderness: There is no abdominal tenderness.   Musculoskeletal:      Cervical back: Neck supple.      Right ankle: No swelling.      Left ankle: No swelling.   Skin:     General: Skin is warm and dry.      Findings: No rash.   Neurological:      Mental Status: He is alert and oriented to person, place, and time. He is not disoriented.   Psychiatric:         Attention and Perception: Attention normal.         Mood and Affect: Mood normal.         Speech: Speech normal.         Behavior: Behavior normal.         Thought Content: Thought content normal.         Cognition and Memory: Cognition and memory normal.         Judgment: Judgment normal.         Current Medications:     aspirin  81 mg Oral Daily    atorvastatin  80 mg Oral Daily    clopidogreL  75 mg Oral Daily    lisinopriL  5 mg Oral Daily    metoprolol succinate  50 mg Oral Daily    mupirocin   Nasal BID     Current Laboratory Results:    Recent Results (from the past 24 hour(s))   APTT    Collection Time: 12/21/23  2:44 AM   Result Value  Ref Range    aPTT 27.4 21.0 - 32.0 sec   CBC auto differential    Collection Time: 12/21/23  2:44 AM   Result Value Ref Range    WBC 9.04 3.90 - 12.70 K/uL    RBC 4.55 (L) 4.60 - 6.20 M/uL    Hemoglobin 14.0 14.0 - 18.0 g/dL    Hematocrit 42.4 40.0 - 54.0 %    MCV 93 82 - 98 fL    MCH 30.8 27.0 - 31.0 pg    MCHC 33.0 32.0 - 36.0 g/dL    RDW 12.8 11.5 - 14.5 %    Platelets 177 150 - 450 K/uL    MPV 10.3 9.2 - 12.9 fL    Immature Granulocytes 0.4 0.0 - 0.5 %    Gran # (ANC) 5.8 1.8 - 7.7 K/uL    Immature Grans (Abs) 0.04 0.00 - 0.04 K/uL    Lymph # 1.7 1.0 - 4.8 K/uL    Mono # 1.2 (H) 0.3 - 1.0 K/uL    Eos # 0.3 0.0 - 0.5 K/uL    Baso # 0.06 0.00 - 0.20 K/uL    nRBC 0 0 /100 WBC    Gran % 64.2 38.0 - 73.0 %    Lymph % 18.8 18.0 - 48.0 %    Mono % 12.8 4.0 - 15.0 %    Eosinophil % 3.1 0.0 - 8.0 %    Basophil % 0.7 0.0 - 1.9 %    Differential Method Automated    Basic Metabolic Panel    Collection Time: 12/21/23  2:44 AM   Result Value Ref Range    Sodium 139 136 - 145 mmol/L    Potassium 4.1 3.5 - 5.1 mmol/L    Chloride 105 95 - 110 mmol/L    CO2 24 23 - 29 mmol/L    Glucose 98 70 - 110 mg/dL    BUN 17 8 - 23 mg/dL    Creatinine 0.9 0.5 - 1.4 mg/dL    Calcium 9.3 8.7 - 10.5 mg/dL    Anion Gap 10 8 - 16 mmol/L    eGFR >60 >60 mL/min/1.73 m^2   Magnesium    Collection Time: 12/21/23  2:44 AM   Result Value Ref Range    Magnesium 1.9 1.6 - 2.6 mg/dL   Phosphorus    Collection Time: 12/21/23  2:44 AM   Result Value Ref Range    Phosphorus 3.4 2.7 - 4.5 mg/dL     Current Imaging Results:    No orders to display       12/18/2023: Echo:  Left Ventricle: The left ventricle is normal in size. Ventricular mass is normal. Normal wall thickness. Regional wall motion abnormalities present. The mid anteroseptal and anterior walls, the apical septum and apex are severely hypokinetic. The remainder of the left ventricular walls contract well. There is mildly reduced systolic function. Ejection fraction by visual approximation is 45%.  Grade I diastolic dysfunction. Normal left ventricular filling pressure. Tissue Doppler velocity is reduced. Average E/e' ratio is 9.50.  Right Ventricle: Normal right ventricular cavity size. Wall thickness is normal. Right ventricle wall motion  is normal. Systolic function is normal.  Left Atrium: Left atrium is mildly dilated.  IVC/SVC: Normal venous pressure at 3 mmHg.        Assessment and Plan:     Problem List:    Active Diagnoses:    Diagnosis Date Noted POA    PRINCIPAL PROBLEM:  ST elevation myocardial infarction (STEMI) [I21.3] 12/18/2023 Yes    Coronary artery disease [I25.10] 12/18/2023 Yes    History of coronary artery stent placement [Z95.5] 12/19/2023 Not Applicable      Problems Resolved During this Admission:     Assessment and Plan:    Coronary Artery Disease              11/2023: Began to experience angina.  12/18/2023: 5 AM: Chest pain at rest. Lateral ST elevation. Received TNK. CP resolved. AIVR. Troponin 45.  12/18/2023: Echo: Normal left ventricular size with overall mild systolic dysfunction. Mid anteroseptal and anterior walls, apical septum and apex severely hypokinetic. Remainder contract well. EF 45%. Mild diastolic dysfunction.   12/19/2023: [facility]: LAD & LCX separate ostea. LAD: Proximal 95%. Mid lengthy 80%. Calcified. D1: Osteal 95%. LCX: Mild. RCA: Dominant. Mild. LAD: Proximal RUBEN 3.0 x 15 mm to 3.2 mm. Mid PTCA 2.5 mm balloon & 2.5 mm cutting balloon. Not possible to cross with stent.  12/20/2023: Reviewed finding during cath and implications with patient.  12/18/2023: Chol 176. HDL 36. . .  On atorvastatin 40 mg Q24.   On aspirin 81 mg Q24.  On clopidogrel 75 mg Q24 for 12 months to 12/31/2024.  On metoprolol 50 mg Q24 and lisinopril 5 mg Q24.  Enroll in cardiac rehabilitation phase 2.  12/21/2023: Stressed the CRITICAL importance of 100% compliance especially with DAPT.    2. Disposition  Home today.  F/u 2-3 weeks.         VTE Risk Mitigation (From admission,  onward)           Ordered     heparin 25,000 units in dextrose 5% 250 mL (100 units/mL) infusion LOW INTENSITY nomogram - [facility]  Continuous        Question:  Begin at (units/kg/hr)  Answer:  12    12/18/23 1218     IP VTE HIGH RISK PATIENT  Once         12/18/23 1003     Place sequential compression device  Until discontinued         12/18/23 1003                    Grace Panda MD  Cardiology  [facility] - Intensive Care ([place])

## 2023-12-21 NOTE — NURSING
@ 9138 Pt was discharged with understanding of discharge instructions and all personal belongings.

## 2023-12-21 NOTE — PLAN OF CARE
No injury noted. VSS. NSR on monitor. Anticipating discharge home.    Problem: Adult Inpatient Plan of Care  Goal: Plan of Care Review  Outcome: Ongoing, Progressing  Goal: Patient-Specific Goal (Individualized)  Outcome: Ongoing, Progressing  Goal: Absence of Hospital-Acquired Illness or Injury  Outcome: Ongoing, Progressing

## 2023-12-22 ENCOUNTER — TELEPHONE (OUTPATIENT)
Dept: CARDIOLOGY | Facility: CLINIC | Age: 73
End: 2023-12-22
Payer: MEDICARE

## 2023-12-22 NOTE — TELEPHONE ENCOUNTER
No answer, no voice mail       ----- Message from Grace Panda MD sent at 12/21/2023  7:26 AM CST -----  F/u about 3 weeks.    Grace Panda M.D.

## 2023-12-23 ENCOUNTER — NURSE TRIAGE (OUTPATIENT)
Dept: ADMINISTRATIVE | Facility: CLINIC | Age: 73
End: 2023-12-23
Payer: MEDICARE

## 2023-12-23 NOTE — TELEPHONE ENCOUNTER
"    Reason for Disposition   Shock suspected (e.g., cold/pale/clammy skin, too weak to stand, low BP, rapid pulse)    Protocols used: Diarrhea-A-MAGY Singh called to say he is having diarrhea since hospitalization.  Has had 6-8 watery stools since last night. He is weak, lightheaded, dehydrated, can't eat or swallow well, is unsteady on his feet. He was admitted to hospital 12/18/2023 for NSTEMI, did have stent placement 12/19. Recommend hang up now, and call 911 for immediate medical attention. States he took four (4)  80 mg tablets of atorvastatin this morning also, as "that is in my discharge papers."  Explained that I am looking at his discharge summary AVS and instructions are to take one 80 mg tablet daily.  Again, stressed hang up now and call 911.  Says would rather go to clinic.  Explained again that he was recently in intensive care, just discharged 12/ 22, yesterday, and he needs to call 911 now.  He did finally agree.  Is with daughter now, and recommend bring all medications with them as well as discharge papers.  She said she will.  Message to Dr Grace Panda, cardiology.  He said he has appt with Dr Lucille Gonzalez, non [facility] provider, to be seen following his hospitalization, but do not see that this has been set up.  Message also to Dr Gonzalez via electronic fax.  Please contact caller directly with any additional care advice.      "

## 2023-12-23 NOTE — TELEPHONE ENCOUNTER
"Spoke with pt who reports that he has been having diarrhea since starting new medications. Reports having nausea, decreased appetite, and muscles weakness. Pt states ," I'm going to stop taking all of these medications until I get back straight." Pt. advised to be seen by provider within 4 hours. ED/UC advised.      Reason for Disposition   [1] SEVERE diarrhea (e.g., 7 or more times / day more than normal) AND [2] age > 60 years    Additional Information   Negative: Shock suspected (e.g., cold/pale/clammy skin, too weak to stand, low BP, rapid pulse)   Negative: Difficult to awaken or acting confused (e.g., disoriented, slurred speech)   Negative: Sounds like a life-threatening emergency to the triager   Negative: [1] SEVERE abdominal pain (e.g., excruciating) AND [2] present > 1 hour   Negative: [1] SEVERE abdominal pain AND [2] age > 60 years   Negative: [1] Blood in the stool AND [2] moderate or large amount of blood   Negative: Black or tarry bowel movements  (Exception: Chronic-unchanged black-grey BMs AND is taking iron pills or Pepto-Bismol.)   Negative: [1] Drinking very little AND [2] dehydration suspected (e.g., no urine > 12 hours, very dry mouth, very lightheaded)   Negative: Patient sounds very sick or weak to the triager    Protocols used: Diarrhea-A-AH    "

## 2023-12-26 ENCOUNTER — TELEPHONE (OUTPATIENT)
Dept: CARDIOLOGY | Facility: CLINIC | Age: 73
End: 2023-12-26
Payer: MEDICARE

## 2023-12-26 NOTE — TELEPHONE ENCOUNTER
Spoke to pt daughter. Pt went to the ER and was observed for a few hours. He is feeling better. Pt daughter reports compliance with medication. Daughter reported pt will call the office to schedule hospital follow up.

## 2023-12-26 NOTE — TELEPHONE ENCOUNTER
Informed pt Scarlet is out of the office today but will be back in tomorrow, 12/27/23. Will send message to call pt to schedule appt.

## 2023-12-26 NOTE — TELEPHONE ENCOUNTER
----- Message from Shira Hernandez sent at 12/26/2023  2:39 PM CST -----  Regarding: appt  Type:  Sooner Appointment Request    Patient is requesting a sooner appointment.  Patient declined first available appointment listed as well as another facility and provider .  Patient will not accept being placed on the waitlist and is requesting a message be sent to doctor.    Name of Caller: SUAD ESCAMILLA [[id number]]    When is the first available appointment? April     Symptoms: 2 week f/u     Would the patient rather a call back or a response via [facility]? no    Best Call Back Number:  [phone]    Additional Information: pt is asking if Scarlet would call to schedule him

## 2023-12-27 ENCOUNTER — TELEPHONE (OUTPATIENT)
Dept: CARDIOLOGY | Facility: CLINIC | Age: 73
End: 2023-12-27
Payer: MEDICARE

## 2023-12-27 NOTE — TELEPHONE ENCOUNTER
Spoke to patient-appt scheduled for 1/16/24    ----- Message from Salome Ricardo sent at 12/27/2023  9:57 AM CST -----  Regarding: Miss call  Type:  Patient Returning Call         Who Called:  PT         Who Left Message for Patient: Scarlet Cheung MA         Does the patient know what this is regarding?: no          Would the patient rather a call back or a response via [facility]?  Call back          Best Call Back Number:[phone]         Additional Information:

## 2023-12-28 NOTE — PHYSICIAN QUERY
"PT Name: Francisco Spencer  MR #: [id number]     DOCUMENTATION CLARIFICATION      CDS/: Samanta Lott RN               Contact information: [email or web address]  This form is a permanent document in the medical record.    Query Date: December 28, 2023    By submitting this query, we are merely seeking further clarification of documentation to reflect the severity of illness of your patient. Please utilize your independent clinical judgment when addressing the question(s) below.     The Medical Record contains the following:   Indicators   Supporting Clinical Findings Location in Medical Record   x Chest Pain, Angina Chest Pain  Pt transfer from [facility] for STEMI. Pt endorsing chest burning and nausea x weeks but stating "I thought it was the new pills I was taking for my eyes but it was  still there when I stopped taking them."    In early 11/2023 he began to experience chest pain when exercising like walking at a moderate pace or doing exercises at  home. After a few days he had less pain and the pain only came on if doing vigorous exercises. This continued into 12/2023. At about 5 am on 12/18/2023 he woke up from chest pain. He became mildly short of breath and began sweating. H&P 12/18          Cards Consult 12/18             x Coronary Artery Disease CAD  - EKG unavailable for review from [facility] ED at time of H&P but reportedly lateral ST elevations. EKG here with sinus bradycardia, nonspecific ST-T changes. Troponin 9.85 from [facility] records, 6.57 here.  - Reports extensive family history of CAD and MI resulting in death of his brother at age 55.   H&P 12/18   x EKG EKG:  Sinus bradycardia   Nonspecific ST abnormality   Abnormal ECG    EKG 12/18   x Troponin  12/18/23 09:44 12/18/23 12:22   Troponin I 6.574 (H) 44.679 (H)    Labs 12/28           x Echo Results Echo:    Left Ventricle: The left ventricle is normal in size. Ventricular mass is normal. Normal wall thickness. Regional wall motion " abnormalities present. The mid anteroseptal and anterior walls, the apical septum and apex are severely hypokinetic. The remainder of the left ventricular walls contract well. There is mildly reduced systolic function. Ejection fraction by visual approximation is 45%. Grade I diastolic dysfunction. Normal left ventricular filling pressure. Tissue Doppler velocity is reduced. Average E/e' ratio is 9.50.    Right Ventricle: Normal right ventricular cavity size. Wall thickness is normal. Right ventricle wall motion  is normal. Systolic function is normal.    Left Atrium: Left atrium is mildly dilated.    IVC/SVC: Normal venous pressure at 3 mmHg. Echo 12/18                           x Angiography Indications  Coronary artery disease involving native coronary artery of native heart with unstable angina pectoris [I25.110 (ICD-10-CM)]  Summary    There was single vessel coronary artery disease.    The PCI was successful.    The Prox LAD lesion was 95% stenosed with 15% stenosis post-intervention.    The Mid LAD lesion was 80% stenosed with 40% stenosis post-intervention.  Pre Procedure Diagnosis  Coronary artery disease [I25.10]   Post Procedure Diagnosis  Coronary artery disease [I25.10]   Cardiac Cath 12/19   x Documentation of acute cardiac condition ST elevation myocardial infarction (STEMI)  CAD  - EKG unavailable for review from [facility] ED at time of H&P but reportedly lateral ST elevations. EKG here with sinus bradycardia, nonspecific ST-T changes. Troponin 9.85 from [facility] records, 6.57 here.  - Reports extensive family history of CAD and MI resulting in death of his brother at age 55.  - Received aspirin at [facility]; continue 81mg PO daily. Clopidogrel loaded 600mg PO x1 in ED, continue 75mg PO daily. Continue heparin gtt. Start nitroglycerin 0.2mg/hr patch transdermal daily.  - Check lipid panel, TSH, HgbA1c.  - Cardiology consulted; appreciate assistance. Plan for [facility] tomorrow AM.    Principal Problem:    ST elevation  myocardial infarction (STEMI)  Coronary Artery Disease    11/2023: Began to experience angina.   12/18/2023: 5 AM: Chest pain at rest. Lateral ST elevation. Received TNK. CP resolved. AIVR.   Plan aspirin, clopidogrel and heparin iv.   NTG patch.   Echo today.   12/19/2023: Plan cath. All issues discussed. Consent signed. Access right radial.    H&P 12/18                          Cards Consult 12/18   x Medication/Treatment Heparin 100 units/ml IV  Plavix 600 mg PO x2  Plavix 75 mg PO QD  Aspirin 81 mg PO QD  NTG 0.2 mg/hr TD patch Q 12 hrs  Atorvastatin 80 mg PO x 1   Atorvastatin 80 mg PO Q HS  Metoprolol 50 mg PO QD   MAR 12/18-12/19  MAR 12/18  MAR 12/19-12/21  MAR 12/19-12/21  MAR 12/18-12/19  MAR 12/20-12/21  MAR 12/21    Other            Provider, please clarify the cardiac diagnosis related to the above documentation:    [   ] NSTEMI     [ X ] STEMI Ruled In (please specify site): _________________     [   ] CAD with unstable angina without Acute Myocardial Infarction     [   ] Other Cardiac Diagnosis (please specify): _______________         Please document in your progress notes daily for the duration of treatment until resolved, and include in your discharge summary.    Form No. [id number]

## 2023-12-29 ENCOUNTER — NURSE TRIAGE (OUTPATIENT)
Dept: ADMINISTRATIVE | Facility: CLINIC | Age: 73
End: 2023-12-29
Payer: MEDICARE

## 2023-12-29 ENCOUNTER — TELEPHONE (OUTPATIENT)
Dept: CARDIOLOGY | Facility: CLINIC | Age: 73
End: 2023-12-29
Payer: MEDICARE

## 2023-12-29 NOTE — TELEPHONE ENCOUNTER
Pt reporting he had surgery on 12/18 for blood clot. Put on statin medications- feels like the more has takes them, the worse he feels. Fatigue, trouble sleeping, bowels irregular. States he decided not to take meds this morning and feels a little better. Concerned the statins are what's causing his current symptoms.     Dispo- discuss with pcp and callback by nurse within 1 hour. High priority messaged routed to specialist office requesting pt call back asap.  Reason for Disposition   Caller has URGENT medicine question about med that PCP or specialist prescribed and triager unable to answer question    Additional Information   Negative: Intentional drug overdose and suicidal thoughts or ideas   Negative: MORE THAN A DOUBLE DOSE of a prescription or over-the-counter (OTC) drug   Negative: DOUBLE DOSE (an extra dose or lesser amount) of prescription drug and any symptoms (e.g., dizziness, nausea, pain, sleepiness)   Negative: DOUBLE DOSE (an extra dose or lesser amount) of over-the-counter (OTC) drug and any symptoms (e.g., dizziness, nausea, pain, sleepiness)   Negative: Took another person's prescription drug   Negative: DOUBLE DOSE (an extra dose or lesser amount) of prescription drug and NO symptoms  (Exception: A double dose of antibiotics.)   Negative: Diabetes drug error or overdose (e.g., took wrong type of insulin or took extra dose)   Negative: Caller has medication question about med NOT prescribed by PCP and triager unable to answer question (e.g., compatibility with other med, storage)   Negative: Prescription not at pharmacy and was prescribed by PCP recently  (Exception: triager has access to EMR and prescription is recorded there. Go to Home Care and confirm for pharmacy.)   Negative: Pharmacy calling with prescription question and triager unable to answer question    Protocols used: Medication Question Call-A-OH

## 2023-12-29 NOTE — TELEPHONE ENCOUNTER
----- Message from Isaiah Padilla sent at 12/29/2023  4:06 PM CST -----  Contact: Patient  Type:  Patient Call          Who Called: Patient         Does the patient know what this is regarding?: Requesting a call back about his medication that he's having a issue with ;the medication has given him every side effect with other issues added ; please advise           Would the patient rather a call back or a response via [facility]?call           Best Call Back Number:[phone]             Additional Information:

## 2023-12-29 NOTE — PHYSICIAN QUERY
"PT Name: Francisco Spencer  MR #: [id number]     DOCUMENTATION CLARIFICATION      CDS/: Samanta Lott RN               Contact information: [email or web address]  This form is a permanent document in the medical record.    Query Date: December 29, 2023    By submitting this query, we are merely seeking further clarification of documentation to reflect the severity of illness of your patient. Please utilize your independent clinical judgment when addressing the question(s) below.     The Medical Record contains the following:   Indicators   Supporting Clinical Findings Location in Medical Record   x Chest Pain, Angina Chest Pain  Pt transfer from [facility] for STEMI. Pt endorsing chest burning and nausea x weeks but stating "I thought it was the new pills I was taking for my eyes but it was  still there when I stopped taking them."    In early 11/2023 he began to experience chest pain when exercising like walking at a moderate pace or doing exercises at  home. After a few days he had less pain and the pain only came on if doing vigorous exercises. This continued into 12/2023. At about 5 am on 12/18/2023 he woke up from chest pain. He became mildly short of breath and began sweating. H&P 12/18          Cards Consult 12/18             x Coronary Artery Disease CAD  - EKG unavailable for review from [facility] ED at time of H&P but reportedly lateral ST elevations. EKG here with sinus bradycardia, nonspecific ST-T changes. Troponin 9.85 from [facility] records, 6.57 here.  - Reports extensive family history of CAD and MI resulting in death of his brother at age 55.   H&P 12/18   x EKG EKG:  Sinus bradycardia   Nonspecific ST abnormality   Abnormal ECG    EKG 12/18   x Troponin  12/18/23 09:44 12/18/23 12:22   Troponin I 6.574 (H) 44.679 (H)    Labs 12/28           x Echo Results Echo:    Left Ventricle: The left ventricle is normal in size. Ventricular mass is normal. Normal wall thickness. Regional wall motion " abnormalities present. The mid anteroseptal and anterior walls, the apical septum and apex are severely hypokinetic. The remainder of the left ventricular walls contract well. There is mildly reduced systolic function. Ejection fraction by visual approximation is 45%. Grade I diastolic dysfunction. Normal left ventricular filling pressure. Tissue Doppler velocity is reduced. Average E/e' ratio is 9.50.    Right Ventricle: Normal right ventricular cavity size. Wall thickness is normal. Right ventricle wall motion  is normal. Systolic function is normal.    Left Atrium: Left atrium is mildly dilated.    IVC/SVC: Normal venous pressure at 3 mmHg. Echo 12/18                           x Angiography Indications  Coronary artery disease involving native coronary artery of native heart with unstable angina pectoris [I25.110 (ICD-10-CM)]  Summary    There was single vessel coronary artery disease.    The PCI was successful.    The Prox LAD lesion was 95% stenosed with 15% stenosis post-intervention.    The Mid LAD lesion was 80% stenosed with 40% stenosis post-intervention.  Pre Procedure Diagnosis  Coronary artery disease [I25.10]   Post Procedure Diagnosis  Coronary artery disease [I25.10]   Cardiac Cath 12/19   x Documentation of acute cardiac condition ST elevation myocardial infarction (STEMI)  CAD  - EKG unavailable for review from [facility] ED at time of H&P but reportedly lateral ST elevations. EKG here with sinus bradycardia, nonspecific ST-T changes. Troponin 9.85 from [facility] records, 6.57 here.  - Reports extensive family history of CAD and MI resulting in death of his brother at age 55.  - Received aspirin at [facility]; continue 81mg PO daily. Clopidogrel loaded 600mg PO x1 in ED, continue 75mg PO daily. Continue heparin gtt. Start nitroglycerin 0.2mg/hr patch transdermal daily.  - Check lipid panel, TSH, HgbA1c.  - Cardiology consulted; appreciate assistance. Plan for [facility] tomorrow AM.    Principal Problem:    ST elevation  myocardial infarction (STEMI)  Coronary Artery Disease    11/2023: Began to experience angina.   12/18/2023: 5 AM: Chest pain at rest. Lateral ST elevation. Received TNK. CP resolved. AIVR.   Plan aspirin, clopidogrel and heparin iv.   NTG patch.   Echo today.   12/19/2023: Plan cath. All issues discussed. Consent signed. Access right radial.    H&P 12/18                          Cards Consult 12/18   x Medication/Treatment Heparin 100 units/ml IV  Plavix 600 mg PO x2  Plavix 75 mg PO QD  Aspirin 81 mg PO QD  NTG 0.2 mg/hr TD patch Q 12 hrs  Atorvastatin 80 mg PO x 1   Atorvastatin 80 mg PO Q HS  Metoprolol 50 mg PO QD   MAR 12/18-12/19  MAR 12/18  MAR 12/19-12/21  MAR 12/19-12/21  MAR 12/18-12/19  MAR 12/20-12/21  MAR 12/21   x Other [ X ] STEMI Ruled In (please specify site): _________________     Query response from   12/29/2023  6:03 AM             Provider, STEMI was Ruled In on previous query answered.  Please specify specific site of STEMI.    [ X ] STEMI Ruled In (please specify site): _proximal LAD________     [   ] Other  (please specify): __________________         Please document in your progress notes daily for the duration of treatment until resolved, and include in your discharge summary.    Form No. [id number]

## 2024-01-02 ENCOUNTER — TELEPHONE (OUTPATIENT)
Dept: CARDIOLOGY | Facility: CLINIC | Age: 74
End: 2024-01-02
Payer: MEDICARE

## 2024-01-02 NOTE — TELEPHONE ENCOUNTER
Pt advised to hold atorvastatin for now. Pt verbalized understanding.      ----- Message from Frances Norman sent at 1/2/2024  8:59 AM CST -----  Contact: SUAD ESCAMILLA [[id number]]  Type: Call Back      Who called: SUAD ESCAMILLA [[id number]]      What is the request in detail: Patient is requesting a call back in regard to the medication atorvastatin (LIPITOR) 80 MG tablet he was prescribed. He states that he is experiencing a lot of side effects.  Please advise.     Can the clinic reply by [facility]? No      Would the patient rather a call back or a response via [facility]? Call back       Best call back number: [phone] (home)       Additional Information:

## 2024-01-10 ENCOUNTER — TELEPHONE (OUTPATIENT)
Dept: ELECTROPHYSIOLOGY | Facility: CLINIC | Age: 74
End: 2024-01-10
Payer: MEDICARE

## 2024-01-10 DIAGNOSIS — I48.3 TYPICAL ATRIAL FLUTTER: Primary | ICD-10-CM

## 2024-01-10 NOTE — TELEPHONE ENCOUNTER
Dr. Mirza reviewed the chart. Patient had MI. No apparent arrhythmia issue. Patient has a cardiology follow up on 1/16 with Dr. Panda. Patients appointment cancelled for Dr. Mirza on 1/11. We will reschedule if Dr. Panda deems necessary for patient to see Dr. Mirza in Arrhythmia. Patient agrees and verbalized understanding.

## 2024-01-22 ENCOUNTER — OFFICE VISIT (OUTPATIENT)
Dept: CARDIOLOGY | Facility: CLINIC | Age: 74
End: 2024-01-22
Attending: INTERNAL MEDICINE
Payer: MEDICARE

## 2024-01-22 VITALS
DIASTOLIC BLOOD PRESSURE: 74 MMHG | OXYGEN SATURATION: 98 % | SYSTOLIC BLOOD PRESSURE: 106 MMHG | BODY MASS INDEX: 25.69 KG/M2 | HEIGHT: 70 IN | WEIGHT: 179.44 LBS | HEART RATE: 73 BPM

## 2024-01-22 DIAGNOSIS — I25.10 CORONARY ARTERY DISEASE INVOLVING NATIVE CORONARY ARTERY OF NATIVE HEART WITHOUT ANGINA PECTORIS: ICD-10-CM

## 2024-01-22 DIAGNOSIS — I25.2 HISTORY OF MYOCARDIAL INFARCTION: ICD-10-CM

## 2024-01-22 DIAGNOSIS — Z95.5 HISTORY OF CORONARY ARTERY STENT PLACEMENT: ICD-10-CM

## 2024-01-22 PROCEDURE — 3078F DIAST BP <80 MM HG: CPT | Mod: CPTII,S$GLB,, | Performed by: INTERNAL MEDICINE

## 2024-01-22 PROCEDURE — 3008F BODY MASS INDEX DOCD: CPT | Mod: CPTII,S$GLB,, | Performed by: INTERNAL MEDICINE

## 2024-01-22 PROCEDURE — 1159F MED LIST DOCD IN RCRD: CPT | Mod: CPTII,S$GLB,, | Performed by: INTERNAL MEDICINE

## 2024-01-22 PROCEDURE — 99215 OFFICE O/P EST HI 40 MIN: CPT | Mod: S$GLB,,, | Performed by: INTERNAL MEDICINE

## 2024-01-22 PROCEDURE — 3288F FALL RISK ASSESSMENT DOCD: CPT | Mod: CPTII,S$GLB,, | Performed by: INTERNAL MEDICINE

## 2024-01-22 PROCEDURE — 99999 PR PBB SHADOW E&M-EST. PATIENT-LVL III: CPT | Mod: PBBFAC,,, | Performed by: INTERNAL MEDICINE

## 2024-01-22 PROCEDURE — 1101F PT FALLS ASSESS-DOCD LE1/YR: CPT | Mod: CPTII,S$GLB,, | Performed by: INTERNAL MEDICINE

## 2024-01-22 PROCEDURE — 1160F RVW MEDS BY RX/DR IN RCRD: CPT | Mod: CPTII,S$GLB,, | Performed by: INTERNAL MEDICINE

## 2024-01-22 PROCEDURE — 3074F SYST BP LT 130 MM HG: CPT | Mod: CPTII,S$GLB,, | Performed by: INTERNAL MEDICINE

## 2024-01-22 RX ORDER — ASPIRIN 81 MG/1
81 TABLET ORAL DAILY
Qty: 90 TABLET | Refills: 3 | Status: SHIPPED | OUTPATIENT
Start: 2024-01-22

## 2024-01-22 RX ORDER — CLOPIDOGREL BISULFATE 75 MG/1
75 TABLET ORAL DAILY
Qty: 90 TABLET | Refills: 3 | Status: SHIPPED | OUTPATIENT
Start: 2024-01-22

## 2024-01-22 RX ORDER — LISINOPRIL 5 MG/1
5 TABLET ORAL DAILY
Qty: 90 TABLET | Refills: 3 | Status: SHIPPED | OUTPATIENT
Start: 2024-01-22

## 2024-01-22 RX ORDER — ROSUVASTATIN CALCIUM 20 MG/1
20 TABLET, COATED ORAL DAILY
Qty: 90 TABLET | Refills: 3 | Status: SHIPPED | OUTPATIENT
Start: 2024-01-22 | End: 2024-03-14

## 2024-01-22 RX ORDER — METOPROLOL SUCCINATE 50 MG/1
50 TABLET, EXTENDED RELEASE ORAL DAILY
Qty: 90 TABLET | Refills: 3 | Status: SHIPPED | OUTPATIENT
Start: 2024-01-22

## 2024-01-22 NOTE — PROGRESS NOTES
Subjective:     Francisco Spencer is a 73 y.o. male with unremarkable past medical history. He denies risk factors for vascular disease. In early 11/2023 he began to experience chest pain when exercising like walking at a moderate pace or doing exercises at home. After a few days he had less pain with the pain only coming on if doing vigorous exercises. This continued into 12/2023. At about 5 am on 12/18/2023 he woke up from chest pain. He became mildly short of breath and began sweating. He presented to the emergency room at [facility]. There was about 3 mm ST elevation in leads I and aVL. He received TNK and it was arranged for him to be transferred. As he was in the ambulance the chest pain resolved. He appears to have had a rhythm change at that time with AIVR. He was taken to [facility] for his further care. On arrival her the chest pain had resolved. He received heparin iv, aspirin and a clopidogrel load. On 12/18/2023 he had an echocardiogram. The left ventricular size was normal with overall mild systolic dysfunction. The mid anteroseptal and anterior walls, the apical septum and apex were severely hypokinetic. The remainder of the left ventricular walls contracted well. The ejection fraction was 45%. There was mild diastolic dysfunction. On 12/19/2023 he underwent coronary angiography. The left anterior descending coronary artery and the left circumflex coronary artery had separate ostea. The left anterior descending coronary artery had a 95% proximal lesion. The mid portion had a lengthy 80% lesion. The vessel was moderately calcified. The first diagonal  branch had an osteal 95% lesion. The left circumflex coronary artery had mild diseases. The right coronary artery was dominant with mild disease. The proximal lesion was stented with a drug eluting stent of 3.0 x 15 mm size dilated to 3.2 mm. The mid left anterior descending coronary artery was dilated with a 2.5 mm balloon and a 2.5  mm cutting balloon. It was not possible to cross with stent. No exertional chest pain or exertional shortness of breath. No palpitations or weak spells. No bleeding. No issues with any of his prescribed medications. Feeling well overall.      Coronary Artery Disease  Presents for follow-up visit. Pertinent negatives include no chest pain, chest pressure, chest tightness, dizziness, leg swelling, muscle weakness, palpitations, shortness of breath or weight gain. The symptoms have been stable.       Review of Systems   Constitutional: Negative for chills, fever, malaise/fatigue and weight gain.   HENT:  Negative for nosebleeds and tinnitus.    Eyes:  Negative for double vision, vision loss in left eye and vision loss in right eye.   Cardiovascular:  Negative for chest pain, claudication, dyspnea on exertion, irregular heartbeat, leg swelling, near-syncope, orthopnea, palpitations, paroxysmal nocturnal dyspnea and syncope.   Respiratory:  Negative for chest tightness, cough, hemoptysis, shortness of breath and wheezing.    Endocrine: Negative for cold intolerance and heat intolerance.   Hematologic/Lymphatic: Negative for bleeding problem. Does not bruise/bleed easily.   Skin:  Negative for color change and rash.   Musculoskeletal:  Negative for back pain, falls, muscle weakness and myalgias.   Gastrointestinal:  Negative for abdominal pain, diarrhea, dysphagia, heartburn, hematemesis, hematochezia, hemorrhoids, jaundice, melena, nausea and vomiting.   Genitourinary:  Negative for dysuria and hematuria.   Neurological:  Negative for dizziness, focal weakness, headaches, light-headedness, loss of balance, numbness, tremors, vertigo and weakness.   Psychiatric/Behavioral:  Negative for altered mental status, depression and memory loss. The patient is not nervous/anxious.    Allergic/Immunologic: Negative for hives and persistent infections.       Current Outpatient Medications on File Prior to Visit   Medication Sig  "Dispense Refill    aspirin (ECOTRIN) 81 MG EC tablet Take 1 tablet (81 mg total) by mouth once daily. 30 tablet 1    clopidogreL (PLAVIX) 75 mg tablet Take 1 tablet (75 mg total) by mouth once daily. 30 tablet 1    lisinopriL (PRINIVIL,ZESTRIL) 5 MG tablet Take 1 tablet (5 mg total) by mouth once daily. 30 tablet 1    metoprolol succinate (TOPROL-XL) 50 MG 24 hr tablet Take 1 tablet (50 mg total) by mouth once daily. 30 tablet 1    atorvastatin (LIPITOR) 80 MG tablet Take 1 tablet (80 mg total) by mouth once daily. (Patient not taking: Reported on 1/22/2024) 30 tablet 1     No current facility-administered medications on file prior to visit.        /74   Pulse 73   Ht 5' 10" (1.778 m)   Wt 81.4 kg (179 lb 7.3 oz)   SpO2 98%   BMI 25.75 kg/m²     Objective:     Physical Exam  Constitutional:       General: He is not in acute distress.     Appearance: Normal appearance. He is well-developed. He is not toxic-appearing or diaphoretic.   HENT:      Head: Normocephalic and atraumatic.      Nose: Nose normal.   Eyes:      General:         Right eye: No discharge.         Left eye: No discharge.      Conjunctiva/sclera:      Right eye: Right conjunctiva is not injected.      Left eye: Left conjunctiva is not injected.      Pupils: Pupils are equal.      Right eye: Pupil is round.      Left eye: Pupil is round.   Neck:      Thyroid: No thyromegaly.      Vascular: No carotid bruit or JVD.   Cardiovascular:      Rate and Rhythm: Normal rate and regular rhythm. No extrasystoles are present.     Chest Wall: PMI is not displaced.      Pulses:           Radial pulses are 2+ on the right side and 2+ on the left side.        Femoral pulses are 2+ on the right side and 2+ on the left side.       Dorsalis pedis pulses are 2+ on the right side and 2+ on the left side.        Posterior tibial pulses are 2+ on the right side and 2+ on the left side.      Heart sounds: S1 normal and S2 normal.      No gallop.   Pulmonary:      " Effort: Pulmonary effort is normal.      Breath sounds: Normal breath sounds.   Abdominal:      Palpations: Abdomen is soft.      Tenderness: There is no abdominal tenderness.   Musculoskeletal:      Cervical back: Neck supple.      Right lower leg: Normal. No swelling. No edema.      Left lower leg: Normal. No swelling. No edema.   Lymphadenopathy:      Head:      Right side of head: No submandibular adenopathy.      Left side of head: No submandibular adenopathy.      Cervical: No cervical adenopathy.   Skin:     General: Skin is warm and dry.      Findings: No rash.      Nails: There is no clubbing.   Neurological:      General: No focal deficit present.      Mental Status: He is alert and oriented to person, place, and time. He is not disoriented.      Cranial Nerves: No cranial nerve deficit.   Psychiatric:         Attention and Perception: Attention normal.         Mood and Affect: Mood and affect normal.         Speech: Speech normal.         Behavior: Behavior normal.         Thought Content: Thought content normal.         Cognition and Memory: Cognition and memory normal.         Judgment: Judgment normal.         Assessment:      1. Coronary artery disease involving native coronary artery of native heart without angina pectoris    2. History of myocardial infarction    3. History of coronary artery stent placement        Plan:     Coronary Artery Disease              11/2023: Began to experience angina.  12/18/2023: 5 AM: Chest pain at rest. Lateral ST elevation. Received TNK. CP resolved. AIVR. Troponin 45.  12/18/2023: Echo: Normal left ventricular size with overall mild systolic dysfunction. Mid anteroseptal and anterior walls, apical septum and apex severely hypokinetic. Remainder contract well. EF 45%. Mild diastolic dysfunction.   12/19/2023: [facility]: LAD & LCX separate ostea. LAD: Proximal 95%. Mid lengthy 80%. Calcified. D1: Osteal 95%. LCX: Mild. RCA: Dominant. Mild. LAD: Proximal RUBEN 3.0 x 15 mm  to 3.2 mm. Mid PTCA 2.5 mm balloon & 2.5 mm cutting balloon. Not possible to cross with stent.  12/20/2023: Reviewed finding during cath and implications with patient.  12/21/2023: Stressed the CRITICAL importance of 100% compliance especially with DAPT.  12/18/2023: Chol 176. HDL 36. . .  On metoprolol 50 mg Q24 and lisinopril 5 mg Q24.  On atorvastatin 40 mg Q24.   On aspirin 81 mg Q24.  On clopidogrel 75 mg Q24 for 12 months to 12/31/2024.  1/22/2024: Atorvastatin 80 mg to be changed to rosuvastatin 20 mg Q24. In 4 weeks to lipid panel, LFTs and CK.    2. Primary Care   Dr. Enriqueta Gonzalez.    F/u 6 weeks.    Grace Panda M.D.

## 2024-01-31 ENCOUNTER — TELEPHONE (OUTPATIENT)
Dept: CARDIOLOGY | Facility: CLINIC | Age: 74
End: 2024-01-31
Payer: MEDICARE

## 2024-01-31 NOTE — TELEPHONE ENCOUNTER
----- Message from Grace Panda MD sent at 1/31/2024  2:21 PM CST -----  Contact: Patient  Suggest he reduce rosuvastatin 20 mg Q24 to rosuvastatin 10 mg Q24.    Do the follow up labs that have been ordered after 1 week on lower dose.    Grace Panda M.D.    ----- Message -----  From: Alejandrina Ricardo  Sent: 1/31/2024  10:58 AM CST  To: rGace Panda MD    Patient C/O side effects from the crestor. Muscle aches, constipation,etc. Please advise.      ----- Message -----  From: Isaiah Padilla  Sent: 1/31/2024   9:14 AM CST  To: Farzad Edwards Staff    Type:  Patient Call          Who Called: patient         Does the patient know what this is regarding?: Requesting a call back to discuss a new medication ; please advise           Would the patient rather a call back or a response via [facility]?call           Best Call Back Number:[phone]             Additional Information:

## 2024-02-05 ENCOUNTER — TELEPHONE (OUTPATIENT)
Dept: CARDIOLOGY | Facility: CLINIC | Age: 74
End: 2024-02-05
Payer: MEDICARE

## 2024-02-05 NOTE — TELEPHONE ENCOUNTER
Pt states he stopped rosuvastatin on Friday due to fatigue, muscle aches, can't sleep, multiple things, even after taking half the dose.  He is scheduled to see his PCP on Monday will get lab work at that time.    ----- Message from Symone Pal sent at 2/5/2024  3:19 PM CST -----  Regarding: Patient Advice                Name of Who is Calling: Francisco Spencer    Who Left The Message: Francisco Spencer    Message Is For : Scarlet       What is the request in detail:     Patient called requesting a call to make this Office aware he has discontinued taking the medication rosuvastatin (CRESTOR) 20 MG tablet.  Patient states he's worried about the side effects and permanent damage to his blood.   Please further advise.   Thank you      Reply by [facility]: NO      Preferred Call Back :  [phone] (N)

## 2024-03-14 ENCOUNTER — OFFICE VISIT (OUTPATIENT)
Dept: CARDIOLOGY | Facility: CLINIC | Age: 74
End: 2024-03-14
Attending: INTERNAL MEDICINE
Payer: MEDICARE

## 2024-03-14 VITALS
OXYGEN SATURATION: 97 % | WEIGHT: 177.69 LBS | DIASTOLIC BLOOD PRESSURE: 54 MMHG | SYSTOLIC BLOOD PRESSURE: 92 MMHG | HEIGHT: 70 IN | HEART RATE: 65 BPM | BODY MASS INDEX: 25.44 KG/M2

## 2024-03-14 DIAGNOSIS — Z95.5 HISTORY OF CORONARY ARTERY STENT PLACEMENT: ICD-10-CM

## 2024-03-14 DIAGNOSIS — I25.2 HISTORY OF MYOCARDIAL INFARCTION: ICD-10-CM

## 2024-03-14 DIAGNOSIS — I25.10 CORONARY ARTERY DISEASE INVOLVING NATIVE CORONARY ARTERY OF NATIVE HEART WITHOUT ANGINA PECTORIS: ICD-10-CM

## 2024-03-14 PROCEDURE — 1101F PT FALLS ASSESS-DOCD LE1/YR: CPT | Mod: CPTII,S$GLB,, | Performed by: INTERNAL MEDICINE

## 2024-03-14 PROCEDURE — 99999 PR PBB SHADOW E&M-EST. PATIENT-LVL III: CPT | Mod: PBBFAC,,, | Performed by: INTERNAL MEDICINE

## 2024-03-14 PROCEDURE — 99215 OFFICE O/P EST HI 40 MIN: CPT | Mod: S$GLB,,, | Performed by: INTERNAL MEDICINE

## 2024-03-14 PROCEDURE — 1126F AMNT PAIN NOTED NONE PRSNT: CPT | Mod: CPTII,S$GLB,, | Performed by: INTERNAL MEDICINE

## 2024-03-14 PROCEDURE — 1159F MED LIST DOCD IN RCRD: CPT | Mod: CPTII,S$GLB,, | Performed by: INTERNAL MEDICINE

## 2024-03-14 PROCEDURE — 3074F SYST BP LT 130 MM HG: CPT | Mod: CPTII,S$GLB,, | Performed by: INTERNAL MEDICINE

## 2024-03-14 PROCEDURE — 3288F FALL RISK ASSESSMENT DOCD: CPT | Mod: CPTII,S$GLB,, | Performed by: INTERNAL MEDICINE

## 2024-03-14 PROCEDURE — 1160F RVW MEDS BY RX/DR IN RCRD: CPT | Mod: CPTII,S$GLB,, | Performed by: INTERNAL MEDICINE

## 2024-03-14 PROCEDURE — 3008F BODY MASS INDEX DOCD: CPT | Mod: CPTII,S$GLB,, | Performed by: INTERNAL MEDICINE

## 2024-03-14 PROCEDURE — 4010F ACE/ARB THERAPY RXD/TAKEN: CPT | Mod: CPTII,S$GLB,, | Performed by: INTERNAL MEDICINE

## 2024-03-14 PROCEDURE — 3078F DIAST BP <80 MM HG: CPT | Mod: CPTII,S$GLB,, | Performed by: INTERNAL MEDICINE

## 2024-03-14 NOTE — PROGRESS NOTES
Subjective:     Francisco Spencer is a 74 y.o. male with unremarkable past medical history. He denies risk factors for vascular disease. In early 11/2023 he began to experience chest pain when exercising like walking at a moderate pace or doing exercises at home. After a few days he had less pain with the pain only coming on if doing vigorous exercises. This continued into 12/2023. At about 5 am on 12/18/2023 he woke up from chest pain. He became mildly short of breath and began sweating. He presented to the emergency room at [facility]. There was about 3 mm ST elevation in leads I and aVL. He received TNK and it was arranged for him to be transferred. As he was in the ambulance the chest pain resolved. He appears to have had a rhythm change at that time with AIVR. He was taken to [facility] for his further care. On arrival her the chest pain had resolved. He received heparin iv, aspirin and a clopidogrel load. On 12/18/2023 he had an echocardiogram. The left ventricular size was normal with overall mild systolic dysfunction. The mid anteroseptal and anterior walls, the apical septum and apex were severely hypokinetic. The remainder of the left ventricular walls contracted well. The ejection fraction was 45%. There was mild diastolic dysfunction. On 12/19/2023 he underwent coronary angiography. The left anterior descending coronary artery and the left circumflex coronary artery had separate ostea. The left anterior descending coronary artery had a 95% proximal lesion. The mid portion had a lengthy 80% lesion. The vessel was moderately calcified. The first diagonal  branch had an osteal 95% lesion. The left circumflex coronary artery had mild diseases. The right coronary artery was dominant with mild disease. The proximal lesion was stented with a drug eluting stent of 3.0 x 15 mm size dilated to 3.2 mm. The mid left anterior descending coronary artery was dilated with a 2.5 mm balloon and a 2.5  mm cutting balloon. It was not possible to cross with stent. He tried atorvastatin and rosuvastatin but felt the statin caused myalgia and he stopped them. No exertional chest pain or exertional shortness of breath. No palpitations or weak spells. No bleeding. No issues with any of his prescribed medications. Feeling well overall.      Coronary Artery Disease  Presents for follow-up visit. Pertinent negatives include no chest pain, chest pressure, chest tightness, dizziness, leg swelling, muscle weakness, palpitations, shortness of breath or weight gain. The symptoms have been stable.       Review of Systems   Constitutional: Negative for chills, fever, malaise/fatigue and weight gain.   HENT:  Negative for nosebleeds and tinnitus.    Eyes:  Negative for double vision, vision loss in left eye and vision loss in right eye.   Cardiovascular:  Negative for chest pain, claudication, dyspnea on exertion, irregular heartbeat, leg swelling, near-syncope, orthopnea, palpitations, paroxysmal nocturnal dyspnea and syncope.   Respiratory:  Negative for chest tightness, cough, hemoptysis, shortness of breath and wheezing.    Endocrine: Negative for cold intolerance and heat intolerance.   Hematologic/Lymphatic: Negative for bleeding problem. Does not bruise/bleed easily.   Skin:  Negative for color change and rash.   Musculoskeletal:  Negative for back pain, falls, muscle weakness and myalgias.   Gastrointestinal:  Negative for abdominal pain, diarrhea, dysphagia, heartburn, hematemesis, hematochezia, hemorrhoids, jaundice, melena, nausea and vomiting.   Genitourinary:  Negative for dysuria and hematuria.   Neurological:  Negative for dizziness, focal weakness, headaches, light-headedness, loss of balance, numbness, tremors, vertigo and weakness.   Psychiatric/Behavioral:  Negative for altered mental status, depression and memory loss. The patient is not nervous/anxious.    Allergic/Immunologic: Negative for hives and  "persistent infections.       Current Outpatient Medications on File Prior to Visit   Medication Sig Dispense Refill    aspirin (ECOTRIN) 81 MG EC tablet Take 1 tablet (81 mg total) by mouth once daily. 90 tablet 3    clopidogreL (PLAVIX) 75 mg tablet Take 1 tablet (75 mg total) by mouth once daily. 90 tablet 3    lisinopriL (PRINIVIL,ZESTRIL) 5 MG tablet Take 1 tablet (5 mg total) by mouth once daily. 90 tablet 3    metoprolol succinate (TOPROL-XL) 50 MG 24 hr tablet Take 1 tablet (50 mg total) by mouth once daily. 90 tablet 3    rosuvastatin (CRESTOR) 20 MG tablet Take 1 tablet (20 mg total) by mouth once daily. (Patient not taking: Reported on 3/14/2024) 90 tablet 3     No current facility-administered medications on file prior to visit.        BP (!) 92/54   Pulse 65   Ht 5' 10" (1.778 m)   Wt 80.6 kg (177 lb 11.1 oz)   SpO2 97%   BMI 25.50 kg/m²     Objective:     Physical Exam  Constitutional:       General: He is not in acute distress.     Appearance: Normal appearance. He is well-developed. He is not toxic-appearing or diaphoretic.   HENT:      Head: Normocephalic and atraumatic.      Nose: Nose normal.   Eyes:      General:         Right eye: No discharge.         Left eye: No discharge.      Conjunctiva/sclera:      Right eye: Right conjunctiva is not injected.      Left eye: Left conjunctiva is not injected.      Pupils: Pupils are equal.      Right eye: Pupil is round.      Left eye: Pupil is round.   Neck:      Thyroid: No thyromegaly.      Vascular: No carotid bruit or JVD.   Cardiovascular:      Rate and Rhythm: Normal rate and regular rhythm. No extrasystoles are present.     Chest Wall: PMI is not displaced.      Pulses:           Radial pulses are 2+ on the right side and 2+ on the left side.        Femoral pulses are 2+ on the right side and 2+ on the left side.       Dorsalis pedis pulses are 2+ on the right side and 2+ on the left side.        Posterior tibial pulses are 2+ on the right side " and 2+ on the left side.      Heart sounds: S1 normal and S2 normal.      No gallop.   Pulmonary:      Effort: Pulmonary effort is normal.      Breath sounds: Normal breath sounds.   Abdominal:      Palpations: Abdomen is soft.      Tenderness: There is no abdominal tenderness.   Musculoskeletal:      Cervical back: Neck supple.      Right lower leg: Normal. No swelling. No edema.      Left lower leg: Normal. No swelling. No edema.   Lymphadenopathy:      Head:      Right side of head: No submandibular adenopathy.      Left side of head: No submandibular adenopathy.      Cervical: No cervical adenopathy.   Skin:     General: Skin is warm and dry.      Findings: No rash.      Nails: There is no clubbing.   Neurological:      General: No focal deficit present.      Mental Status: He is alert and oriented to person, place, and time. He is not disoriented.      Cranial Nerves: No cranial nerve deficit.   Psychiatric:         Attention and Perception: Attention normal.         Mood and Affect: Mood and affect normal.         Speech: Speech normal.         Behavior: Behavior normal.         Thought Content: Thought content normal.         Cognition and Memory: Cognition and memory normal.         Judgment: Judgment normal.         Assessment:      1. Coronary artery disease involving native coronary artery of native heart without angina pectoris    2. History of myocardial infarction    3. History of coronary artery stent placement        Plan:     Coronary Artery Disease              11/2023: Began to experience angina.  12/18/2023: 5 AM: Chest pain at rest. Lateral ST elevation. Received TNK. CP resolved. AIVR. Troponin 45.  12/18/2023: Echo: Normal left ventricular size with overall mild systolic dysfunction. Mid anteroseptal and anterior walls, apical septum and apex severely hypokinetic. Remainder contract well. EF 45%. Mild diastolic dysfunction.   12/19/2023: [facility]: LAD & LCX separate ostea. LAD: Proximal 95%. Mid  lengthy 80%. Calcified. D1: Osteal 95%. LCX: Mild. RCA: Dominant. Mild. LAD: Proximal RUBEN 3.0 x 15 mm to 3.2 mm. Mid PTCA 2.5 mm balloon & 2.5 mm cutting balloon. Not possible to cross with stent.  12/20/2023: Reviewed finding during cath and implications with patient.  12/21/2023: Stressed the CRITICAL importance of 100% compliance especially with DAPT.  12/18/2023: Chol 176. HDL 36. . .  1/22/2024: Atorvastatin 80 mg Q24 was changed to rosuvastatin 20 mg Q24. Myalgia on statin an stopped statin.  2/12/2024: Chol 172. HDL 43. . . [Off statin].  On metoprolol 50 mg Q24 and lisinopril 5 mg Q24.  On aspirin 81 mg Q24.  On clopidogrel 75 mg Q24 for 12 months to 12/31/2024.  3/14/2024: Discussed options. He does not want to try a low dose statin. Discussed alternatives. It does not want treatment.    2. PSA Elevation   2/12/2024: PSA 16.   Will ask urology to see.    3. Primary Care   Dr. Enriqueta Gonzalez.    F/u 2 months.    Grace Panda M.D.      Copy:    Dr. Danny Cisneros.

## 2024-03-18 ENCOUNTER — TELEPHONE (OUTPATIENT)
Dept: UROLOGY | Facility: CLINIC | Age: 74
End: 2024-03-18
Payer: MEDICARE

## 2024-03-18 NOTE — TELEPHONE ENCOUNTER
Appt scheduled.  ----- Message from Ibis Hsu sent at 3/18/2024  9:25 AM CDT -----  Name of Who is Calling: SUAD ESCAMILLA [[id number]]              What is the request in detail: Patient requesting a call back to discuss scheduling appt              Can the clinic reply by [facility]: No              What Number to Call Back if not in [facility]:  [phone]

## 2024-03-25 NOTE — PROGRESS NOTES
"Subjective:      Francisco Spencer is a 74 y.o. male who presents today regarding his elevated psa  Self-referred    Previously saw Dr Nick for prostatitis  Psa was 2-9 range  Was treated with antibiotics and self-treated with saw palmetto    He had an MI in Dec 2023  Had stent and was placed on plavix  His cardiologist plans to stop this in Dec 2024    He is taking supplements    Nocturia is better with supplements    No LUTS at this time    The following portions of the patient's history were reviewed and updated as appropriate: allergies, current medications, past family history, past medical history, past social history, past surgical history and problem list.    Review of Systems  Pertinent items are noted in HPI.  A comprehensive multipoint review of systems was negative except as otherwise stated in the HPI.    No past medical history on file.  Past Surgical History:   Procedure Laterality Date    CORONARY ANGIOGRAPHY N/A 12/19/2023    Procedure: ANGIOGRAM, CORONARY ARTERY;  Surgeon: Grace Panda MD;  Location: [facility] CATH LAB;  Service: Cardiology;  Laterality: N/A;    STENT, DRUG ELUTING, SINGLE VESSEL, CORONARY  12/19/2023    Procedure: Stent, Drug Eluting, Single Vessel, Coronary;  Surgeon: Grace Panda MD;  Location: [facility] CATH LAB;  Service: Cardiology;;    TONSILLECTOMY      WISDOM TOOTH EXTRACTION         Review of patient's allergies indicates:   Allergen Reactions    Meperidine Other (See Comments)     Oversedation - reports "was out for three days" after receiving in past          Objective:   Vitals: There were no vitals taken for this visit.    Physical Exam   General: alert and oriented, no acute distress  Respiratory: Symmetric expansion, non-labored breathing  Cardiovascular: no peripheral edema  Abdomen: soft, non distended  Skin: normal coloration and turgor, no rashes, no suspicious skin lesions noted  Neuro: no gross deficits  Psych: normal judgment and insight, normal mood/affect, and " "non-anxious  Prostate  Soft boggy  Non-tender  No nodules    Physical Exam    Lab Review   Urinalysis demonstrates negative for all components  Lab Results   Component Value Date    WBC 9.04 12/21/2023    HGB 14.0 12/21/2023    HCT 42.4 12/21/2023    MCV 93 12/21/2023     12/21/2023     Lab Results   Component Value Date    CREATININE 0.9 12/21/2023    BUN 17 12/21/2023     No results found for: "PSA", "PSADIAG", "PSATOTAL", "PSAFREE", "PSAFREEPCT"      Imaging  -    Assessment and Plan:   Elevated PSA and history of prostatitis      UA and reflex  PSA and BMP    MRI the RTC    Discussed prostate biopsy  Will discuss further after the MRI         Plavix therapy  Will need to hold plavix 1 week prior to biopsy if OK with cards, Dr Panda    "

## 2024-03-26 ENCOUNTER — OFFICE VISIT (OUTPATIENT)
Dept: UROLOGY | Facility: CLINIC | Age: 74
End: 2024-03-26
Payer: MEDICARE

## 2024-03-26 VITALS
RESPIRATION RATE: 12 BRPM | SYSTOLIC BLOOD PRESSURE: 110 MMHG | BODY MASS INDEX: 25.25 KG/M2 | WEIGHT: 176.38 LBS | OXYGEN SATURATION: 100 % | HEART RATE: 69 BPM | DIASTOLIC BLOOD PRESSURE: 71 MMHG | HEIGHT: 70 IN

## 2024-03-26 DIAGNOSIS — R97.20 ELEVATED PSA: Primary | ICD-10-CM

## 2024-03-26 PROCEDURE — 3008F BODY MASS INDEX DOCD: CPT | Mod: CPTII,S$GLB,, | Performed by: UROLOGY

## 2024-03-26 PROCEDURE — 3288F FALL RISK ASSESSMENT DOCD: CPT | Mod: CPTII,S$GLB,, | Performed by: UROLOGY

## 2024-03-26 PROCEDURE — 3078F DIAST BP <80 MM HG: CPT | Mod: CPTII,S$GLB,, | Performed by: UROLOGY

## 2024-03-26 PROCEDURE — 99204 OFFICE O/P NEW MOD 45 MIN: CPT | Mod: S$GLB,,, | Performed by: UROLOGY

## 2024-03-26 PROCEDURE — 1159F MED LIST DOCD IN RCRD: CPT | Mod: CPTII,S$GLB,, | Performed by: UROLOGY

## 2024-03-26 PROCEDURE — 1126F AMNT PAIN NOTED NONE PRSNT: CPT | Mod: CPTII,S$GLB,, | Performed by: UROLOGY

## 2024-03-26 PROCEDURE — 1101F PT FALLS ASSESS-DOCD LE1/YR: CPT | Mod: CPTII,S$GLB,, | Performed by: UROLOGY

## 2024-03-26 PROCEDURE — 4010F ACE/ARB THERAPY RXD/TAKEN: CPT | Mod: CPTII,S$GLB,, | Performed by: UROLOGY

## 2024-03-26 PROCEDURE — 3074F SYST BP LT 130 MM HG: CPT | Mod: CPTII,S$GLB,, | Performed by: UROLOGY

## 2024-04-01 DIAGNOSIS — R97.20 ELEVATED PSA: Primary | ICD-10-CM

## 2024-04-02 ENCOUNTER — TELEPHONE (OUTPATIENT)
Dept: UROLOGY | Facility: CLINIC | Age: 74
End: 2024-04-02
Payer: MEDICARE

## 2024-04-02 NOTE — TELEPHONE ENCOUNTER
Appt scheduled.  ----- Message from Shi Vidales NP sent at 4/1/2024 11:59 AM CDT -----  Orders signed- please schedule BMP, PSA, MRI prostate and then follow up with Dr. Cisneros. Thanks    ----- Message -----  From: Leyla Varela MA  Sent: 3/26/2024   3:21 PM CDT  To: Danny Cisneros MD      ----- Message -----  From: Danny Cisneros MD  Sent: 3/26/2024   2:41 PM CDT  To: Blayne Delgado Staff    BMP, PSA, MRI prostate then RTC to see Dr Cisneros

## 2024-04-16 ENCOUNTER — LAB VISIT (OUTPATIENT)
Dept: LAB | Facility: HOSPITAL | Age: 74
End: 2024-04-16
Attending: NURSE PRACTITIONER
Payer: MEDICARE

## 2024-04-16 DIAGNOSIS — R97.20 ELEVATED PSA: ICD-10-CM

## 2024-04-16 LAB
ANION GAP SERPL CALC-SCNC: 9 MMOL/L (ref 8–16)
CALCIUM SERPL-MCNC: 9.5 MG/DL (ref 8.7–10.5)
CHLORIDE SERPL-SCNC: 104 MMOL/L (ref 95–110)
CO2 SERPL-SCNC: 27 MMOL/L (ref 23–29)
COMPLEXED PSA SERPL-MCNC: 11.9 NG/ML (ref 0–4)
CREAT SERPL-MCNC: 0.93 MG/DL (ref 0.5–1.4)
EST. GFR  (NO RACE VARIABLE): >60 ML/MIN/1.73 M^2
GLUCOSE SERPL-MCNC: 91 MG/DL (ref 70–110)
POTASSIUM SERPL-SCNC: 5 MMOL/L (ref 3.5–5.1)
SODIUM SERPL-SCNC: 140 MMOL/L (ref 136–145)
UUN UR-MCNC: 16 MG/DL (ref 2–20)

## 2024-04-16 PROCEDURE — 84153 ASSAY OF PSA TOTAL: CPT | Performed by: NURSE PRACTITIONER

## 2024-04-16 PROCEDURE — 80048 BASIC METABOLIC PNL TOTAL CA: CPT | Mod: PN | Performed by: NURSE PRACTITIONER

## 2024-04-16 PROCEDURE — 36415 COLL VENOUS BLD VENIPUNCTURE: CPT | Mod: PN | Performed by: NURSE PRACTITIONER

## 2024-05-02 ENCOUNTER — HOSPITAL ENCOUNTER (OUTPATIENT)
Dept: RADIOLOGY | Facility: HOSPITAL | Age: 74
Discharge: HOME | End: 2024-05-02
Attending: NURSE PRACTITIONER
Payer: MEDICARE

## 2024-05-02 DIAGNOSIS — R97.20 ELEVATED PSA: ICD-10-CM

## 2024-05-02 PROCEDURE — 72197 MRI PELVIS W/O & W/DYE: CPT | Mod: 26,,, | Performed by: INTERNAL MEDICINE

## 2024-05-02 PROCEDURE — 72197 MRI PELVIS W/O & W/DYE: CPT | Mod: TC

## 2024-05-02 PROCEDURE — 25500020 PHARM REV CODE 255: Performed by: NURSE PRACTITIONER

## 2024-05-02 PROCEDURE — A9585 GADOBUTROL INJECTION: HCPCS | Performed by: NURSE PRACTITIONER

## 2024-05-02 RX ORDER — GADOBUTROL 604.72 MG/ML
10 INJECTION INTRAVENOUS
Status: COMPLETED | OUTPATIENT
Start: 2024-05-02 | End: 2024-05-02

## 2024-05-02 RX ADMIN — GADOBUTROL 10 ML: 604.72 INJECTION INTRAVENOUS at 04:05

## 2024-05-06 ENCOUNTER — TELEPHONE (OUTPATIENT)
Dept: UROLOGY | Facility: CLINIC | Age: 74
End: 2024-05-06
Payer: MEDICARE

## 2024-05-06 ENCOUNTER — NURSE TRIAGE (OUTPATIENT)
Dept: ADMINISTRATIVE | Facility: CLINIC | Age: 74
End: 2024-05-06
Payer: MEDICARE

## 2024-05-06 DIAGNOSIS — T14.8XXA BRUISING: ICD-10-CM

## 2024-05-06 DIAGNOSIS — Z95.5 HISTORY OF CORONARY ARTERY STENT PLACEMENT: Primary | ICD-10-CM

## 2024-05-06 NOTE — TELEPHONE ENCOUNTER
Several blood thinners. Bruising on both arms, dizziness with standing, tenderness on feet, blood shot eyes. Pt stated he is taking 4 different pills. He has Plavix and aspirin on his med profile. Care advice recommend pt go to Er. Pt refused. Please call and advise pt.   Reason for Disposition   Dizziness or lightheadedness    Additional Information   Negative: Shock suspected (e.g., cold/pale/clammy skin, too weak to stand, low BP, rapid pulse)   Negative: Fever and purple or blood-colored spots or dots   Negative: Sounds like a life-threatening emergency to the triager    Protocols used: Bruises-A-OH

## 2024-05-07 ENCOUNTER — TELEPHONE (OUTPATIENT)
Dept: CARDIOLOGY | Facility: CLINIC | Age: 74
End: 2024-05-07
Payer: MEDICARE

## 2024-05-07 NOTE — TELEPHONE ENCOUNTER
Reached out to patient at both phone numbers to schedule lab work and appt, no answer, no voice mail

## 2024-05-08 NOTE — PROGRESS NOTES
"Subjective:      Francisco Spencer is a 74 y.o. male who returns today regarding his     Here to review MRI.    The following portions of the patient's history were reviewed and updated as appropriate: allergies, current medications, past family history, past medical history, past social history, past surgical history and problem list.    Review of Systems  Pertinent items are noted in HPI.  A comprehensive multipoint review of systems was negative except as otherwise stated in the HPI.    No past medical history on file.  Past Surgical History:   Procedure Laterality Date    CORONARY ANGIOGRAPHY N/A 12/19/2023    Procedure: ANGIOGRAM, CORONARY ARTERY;  Surgeon: Grace Panda MD;  Location: [facility] CATH LAB;  Service: Cardiology;  Laterality: N/A;    STENT, DRUG ELUTING, SINGLE VESSEL, CORONARY  12/19/2023    Procedure: Stent, Drug Eluting, Single Vessel, Coronary;  Surgeon: Grace Panda MD;  Location: [facility] CATH LAB;  Service: Cardiology;;    TONSILLECTOMY      WISDOM TOOTH EXTRACTION         Review of patient's allergies indicates:   Allergen Reactions    Meperidine Other (See Comments)     Oversedation - reports "was out for three days" after receiving in past          Objective:   Vitals: There were no vitals taken for this visit.    Physical Exam   General: alert and oriented, no acute distress  Respiratory: Symmetric expansion, non-labored breathing  Cardiovascular: no peripheral edema  Abdomen: soft, non distended  Skin: normal coloration and turgor, no rashes, no suspicious skin lesions noted  Neuro: no gross deficits  Psych: normal judgment and insight, normal mood/affect, and non-anxious    Physical Exam    Lab Review   Urinalysis demonstrates pending    Lab Results   Component Value Date    WBC 9.04 12/21/2023    HGB 14.0 12/21/2023    HCT 42.4 12/21/2023    MCV 93 12/21/2023     12/21/2023     Lab Results   Component Value Date    CREATININE 0.93 04/16/2024    BUN 16 04/16/2024     Lab Results "   Component Value Date    PSADIAG 11.9 (H) 04/16/2024         Imaging  MRI PROSTATE W W/O CONTRAST     CLINICAL HISTORY:  elevated psa;  Elevated prostate specific antigen (PSA)     TECHNIQUE:  Multiparametric multiplanar, multisequence images including high resolution, small field-of-view T2-WI; axial diffusion weighted images with multiple B-values and creation of ADC-maps; and dynamic contrast enhanced T1-weighted images through the prostate were obtained before, during, and after the administration of 10 cc intravenous gadolinium.     COMPARISON:  None.     FINDINGS:  Previous biopsy: None.     PSA: 11.9 ng/mL on 04/16/2024     Prior therapy: None.     Prostate: 6.1 x 4.7 x 4.4 cm corresponding to a computed volume of 66 cc.     Peripheral zone: There are bandlike regions of mild T2 hypointense, mild DWI hyperintense, and mild ADC hypointense signal in the peripheral zone bilaterally.  No focal lesion concerning for prostate cancer.     Transitional zone: Benign prostatic hyperplasia.  There is a focal lesion as follows:     Lesion (TIKA) #T-1     Location: Side:Left; Region: Base, mid, and apex; Zone: Anterior and posterior     Greatest dimension: There is a 3.1 x 3.0 x 2.5 cm lesion occupying the majority of the left transitional zone.     T2-WI: Lenticular or non-circumscribed, homogeneous, moderately hypointense, and >1.5 cm in greatest dimension, score 5.     DWI/ADC: Focal markedly hypointense on ADC and markedly hyperintense on high b-value DWI; >1.5 cm in greatest dimension, score 5.     DCE: Positive.     Extraprostatic extension: Negative.     PI-RADS assessment category: 5     Neurovascular bundle: Normal appearance.     Seminal vesicles: Normal appearance.     Adjacent Organ Involvement: No evidence for urinary bladder or rectal invasion.     Lymphadenopathy: None.     Other Findings: None.     Impression:     3.1 cm PI-RADS 5 lesion occupying the majority of the left transitional zone.     Overall  Assessment: 5     Number of targets created for potential MR/US fusion biopsy     Peripheral zone: 0     Transition zone: 0        Electronically signed by:Rylan Pozo  Date:                                            05/03/2024  Time:                                           11:59        Assessment and Plan:   Prostate nodule; left anterior TZ PIRADS 5  I explained that I believe he has prostate cancer and I recommend biopsy    Elevated PSA; 11    Enlarged prostate; 66G    Ua and reflex cs  Cipro  Enemas  Uronav prostate biopsy ASAP    STOP ALL SUPPLEMENTS 1 WEEK PRIOR TO BIOPSY    Will need to hold plavix for 7 days prior to biopsy if OK with cards, Dr Panda

## 2024-05-09 ENCOUNTER — LAB VISIT (OUTPATIENT)
Dept: LAB | Facility: OTHER | Age: 74
End: 2024-05-09
Attending: INTERNAL MEDICINE
Payer: MEDICARE

## 2024-05-09 ENCOUNTER — OFFICE VISIT (OUTPATIENT)
Dept: UROLOGY | Facility: CLINIC | Age: 74
End: 2024-05-09
Payer: MEDICARE

## 2024-05-09 VITALS
RESPIRATION RATE: 12 BRPM | SYSTOLIC BLOOD PRESSURE: 103 MMHG | WEIGHT: 176.38 LBS | OXYGEN SATURATION: 100 % | DIASTOLIC BLOOD PRESSURE: 70 MMHG | HEIGHT: 70 IN | BODY MASS INDEX: 25.25 KG/M2 | HEART RATE: 57 BPM

## 2024-05-09 DIAGNOSIS — T14.8XXA BRUISING: ICD-10-CM

## 2024-05-09 DIAGNOSIS — R97.20 ELEVATED PSA: ICD-10-CM

## 2024-05-09 DIAGNOSIS — N40.2 PROSTATE NODULE: Primary | ICD-10-CM

## 2024-05-09 DIAGNOSIS — N40.0 ENLARGED PROSTATE: ICD-10-CM

## 2024-05-09 DIAGNOSIS — Z95.5 HISTORY OF CORONARY ARTERY STENT PLACEMENT: ICD-10-CM

## 2024-05-09 LAB
BASOPHILS # BLD AUTO: 0.11 K/UL (ref 0–0.2)
BASOPHILS NFR BLD: 1.6 % (ref 0–1.9)
DIFFERENTIAL METHOD BLD: ABNORMAL
EOSINOPHIL # BLD AUTO: 0.5 K/UL (ref 0–0.5)
EOSINOPHIL NFR BLD: 7.9 % (ref 0–8)
ERYTHROCYTE [DISTWIDTH] IN BLOOD BY AUTOMATED COUNT: 12.6 % (ref 11.5–14.5)
HCT VFR BLD AUTO: 42.5 % (ref 40–54)
HGB BLD-MCNC: 13.8 G/DL (ref 14–18)
IMM GRANULOCYTES # BLD AUTO: 0.02 K/UL (ref 0–0.04)
IMM GRANULOCYTES NFR BLD AUTO: 0.3 % (ref 0–0.5)
LYMPHOCYTES # BLD AUTO: 1.9 K/UL (ref 1–4.8)
LYMPHOCYTES NFR BLD: 29 % (ref 18–48)
MCH RBC QN AUTO: 31 PG (ref 27–31)
MCHC RBC AUTO-ENTMCNC: 32.5 G/DL (ref 32–36)
MCV RBC AUTO: 96 FL (ref 82–98)
MONOCYTES # BLD AUTO: 0.7 K/UL (ref 0.3–1)
MONOCYTES NFR BLD: 10.5 % (ref 4–15)
NEUTROPHILS # BLD AUTO: 3.4 K/UL (ref 1.8–7.7)
NEUTROPHILS NFR BLD: 50.7 % (ref 38–73)
NRBC BLD-RTO: 0 /100 WBC
PLATELET # BLD AUTO: 185 K/UL (ref 150–450)
PMV BLD AUTO: 10.2 FL (ref 9.2–12.9)
RBC # BLD AUTO: 4.45 M/UL (ref 4.6–6.2)
WBC # BLD AUTO: 6.69 K/UL (ref 3.9–12.7)

## 2024-05-09 PROCEDURE — 3074F SYST BP LT 130 MM HG: CPT | Mod: CPTII,S$GLB,, | Performed by: UROLOGY

## 2024-05-09 PROCEDURE — 1101F PT FALLS ASSESS-DOCD LE1/YR: CPT | Mod: CPTII,S$GLB,, | Performed by: UROLOGY

## 2024-05-09 PROCEDURE — 3288F FALL RISK ASSESSMENT DOCD: CPT | Mod: CPTII,S$GLB,, | Performed by: UROLOGY

## 2024-05-09 PROCEDURE — 4010F ACE/ARB THERAPY RXD/TAKEN: CPT | Mod: CPTII,S$GLB,, | Performed by: UROLOGY

## 2024-05-09 PROCEDURE — 1126F AMNT PAIN NOTED NONE PRSNT: CPT | Mod: CPTII,S$GLB,, | Performed by: UROLOGY

## 2024-05-09 PROCEDURE — 36415 COLL VENOUS BLD VENIPUNCTURE: CPT | Performed by: INTERNAL MEDICINE

## 2024-05-09 PROCEDURE — 1159F MED LIST DOCD IN RCRD: CPT | Mod: CPTII,S$GLB,, | Performed by: UROLOGY

## 2024-05-09 PROCEDURE — 99214 OFFICE O/P EST MOD 30 MIN: CPT | Mod: S$GLB,,, | Performed by: UROLOGY

## 2024-05-09 PROCEDURE — 3008F BODY MASS INDEX DOCD: CPT | Mod: CPTII,S$GLB,, | Performed by: UROLOGY

## 2024-05-09 PROCEDURE — 85025 COMPLETE CBC W/AUTO DIFF WBC: CPT | Performed by: INTERNAL MEDICINE

## 2024-05-09 PROCEDURE — 3078F DIAST BP <80 MM HG: CPT | Mod: CPTII,S$GLB,, | Performed by: UROLOGY

## 2024-05-09 RX ORDER — CIPROFLOXACIN 500 MG/1
500 TABLET ORAL 2 TIMES DAILY
Qty: 4 TABLET | Refills: 0 | Status: SHIPPED | OUTPATIENT
Start: 2024-05-09 | End: 2024-05-28

## 2024-05-10 ENCOUNTER — TELEPHONE (OUTPATIENT)
Dept: CARDIOLOGY | Facility: CLINIC | Age: 74
End: 2024-05-10
Payer: MEDICARE

## 2024-05-10 NOTE — TELEPHONE ENCOUNTER
Pt notified and verbalized understanding      ----- Message from Grace Panda MD sent at 5/9/2024  4:24 PM CDT -----  Laboratory data reviewed. All results favorable.     Please call the patient and inform the patient about results.     Grace Panda M.D.

## 2024-05-13 ENCOUNTER — TELEPHONE (OUTPATIENT)
Dept: UROLOGY | Facility: CLINIC | Age: 74
End: 2024-05-13
Payer: MEDICARE

## 2024-05-13 DIAGNOSIS — R97.20 ELEVATED PSA: ICD-10-CM

## 2024-05-13 DIAGNOSIS — N40.2 PROSTATE NODULE: Primary | ICD-10-CM

## 2024-05-13 RX ORDER — LIDOCAINE HYDROCHLORIDE 20 MG/ML
JELLY TOPICAL ONCE
OUTPATIENT
Start: 2024-05-13 | End: 2024-05-13

## 2024-05-13 NOTE — TELEPHONE ENCOUNTER
Patient would like to do a perineal biopsy instead of transrectal based on information he read on the internet. Would also like to learn more about HIFU. Message sent to Dr. Cisneros for next steps.    ----- Message from Amanda Powell MA sent at 5/13/2024  9:33 AM CDT -----  Regarding: FW: call back    ----- Message -----  From: Mallorie Finn  Sent: 5/13/2024   9:05 AM CDT  To: Blayne Delgado Staff  Subject: call back                                        Name of caller: savannah       What is the requesting detail: pt is requesting a call back. Stats he wants to hold off his procedure until he has more information on it. Please advise.       Can the clinic reply by [facility]:       What number to call back: [phone]

## 2024-05-13 NOTE — TELEPHONE ENCOUNTER
Dr Cisneros called Mr Spencer and answered all of his questions  We will cancel his biopsy on 6/7 and schedule a transperineal prostate biopsy under MAC anesthesia on 6/10.  All the same preop instructions except he needs to be NPO after mn.     He has questions about HIFU.  If his prostate biopsy is +, we will have him see Dr Lynch to discuss this further.

## 2024-05-28 ENCOUNTER — OFFICE VISIT (OUTPATIENT)
Dept: CARDIOLOGY | Facility: CLINIC | Age: 74
End: 2024-05-28
Attending: INTERNAL MEDICINE
Payer: MEDICARE

## 2024-05-28 ENCOUNTER — HOSPITAL ENCOUNTER (OUTPATIENT)
Dept: PREADMISSION TESTING | Facility: OTHER | Age: 74
Discharge: HOME | End: 2024-05-28
Attending: UROLOGY
Payer: MEDICARE

## 2024-05-28 VITALS
WEIGHT: 170 LBS | RESPIRATION RATE: 16 BRPM | TEMPERATURE: 98 F | HEART RATE: 66 BPM | HEIGHT: 70 IN | OXYGEN SATURATION: 98 % | DIASTOLIC BLOOD PRESSURE: 75 MMHG | SYSTOLIC BLOOD PRESSURE: 126 MMHG | BODY MASS INDEX: 24.34 KG/M2

## 2024-05-28 VITALS
WEIGHT: 173.75 LBS | DIASTOLIC BLOOD PRESSURE: 69 MMHG | OXYGEN SATURATION: 97 % | BODY MASS INDEX: 24.87 KG/M2 | HEART RATE: 64 BPM | SYSTOLIC BLOOD PRESSURE: 103 MMHG | HEIGHT: 70 IN

## 2024-05-28 DIAGNOSIS — Z95.5 HISTORY OF CORONARY ARTERY STENT PLACEMENT: ICD-10-CM

## 2024-05-28 DIAGNOSIS — I25.10 CORONARY ARTERY DISEASE INVOLVING NATIVE CORONARY ARTERY OF NATIVE HEART WITHOUT ANGINA PECTORIS: ICD-10-CM

## 2024-05-28 DIAGNOSIS — I25.2 HISTORY OF MYOCARDIAL INFARCTION: ICD-10-CM

## 2024-05-28 PROCEDURE — 99999 PR PBB SHADOW E&M-EST. PATIENT-LVL III: CPT | Mod: PBBFAC,,, | Performed by: INTERNAL MEDICINE

## 2024-05-28 PROCEDURE — 4010F ACE/ARB THERAPY RXD/TAKEN: CPT | Mod: CPTII,S$GLB,, | Performed by: INTERNAL MEDICINE

## 2024-05-28 PROCEDURE — 3288F FALL RISK ASSESSMENT DOCD: CPT | Mod: CPTII,S$GLB,, | Performed by: INTERNAL MEDICINE

## 2024-05-28 PROCEDURE — 3074F SYST BP LT 130 MM HG: CPT | Mod: CPTII,S$GLB,, | Performed by: INTERNAL MEDICINE

## 2024-05-28 PROCEDURE — 3008F BODY MASS INDEX DOCD: CPT | Mod: CPTII,S$GLB,, | Performed by: INTERNAL MEDICINE

## 2024-05-28 PROCEDURE — 1126F AMNT PAIN NOTED NONE PRSNT: CPT | Mod: CPTII,S$GLB,, | Performed by: INTERNAL MEDICINE

## 2024-05-28 PROCEDURE — 99214 OFFICE O/P EST MOD 30 MIN: CPT | Mod: S$GLB,,, | Performed by: INTERNAL MEDICINE

## 2024-05-28 PROCEDURE — 1101F PT FALLS ASSESS-DOCD LE1/YR: CPT | Mod: CPTII,S$GLB,, | Performed by: INTERNAL MEDICINE

## 2024-05-28 PROCEDURE — 1159F MED LIST DOCD IN RCRD: CPT | Mod: CPTII,S$GLB,, | Performed by: INTERNAL MEDICINE

## 2024-05-28 PROCEDURE — 3078F DIAST BP <80 MM HG: CPT | Mod: CPTII,S$GLB,, | Performed by: INTERNAL MEDICINE

## 2024-05-28 PROCEDURE — 1160F RVW MEDS BY RX/DR IN RCRD: CPT | Mod: CPTII,S$GLB,, | Performed by: INTERNAL MEDICINE

## 2024-05-28 RX ORDER — VITAMIN B COMPLEX
1 CAPSULE ORAL DAILY
COMMUNITY

## 2024-05-28 RX ORDER — SODIUM CHLORIDE, SODIUM LACTATE, POTASSIUM CHLORIDE, CALCIUM CHLORIDE 600; 310; 30; 20 MG/100ML; MG/100ML; MG/100ML; MG/100ML
INJECTION, SOLUTION INTRAVENOUS CONTINUOUS
Status: CANCELLED | OUTPATIENT
Start: 2024-05-28

## 2024-05-28 RX ORDER — ROSUVASTATIN CALCIUM 5 MG/1
5 TABLET, COATED ORAL DAILY
Qty: 90 TABLET | Refills: 3 | Status: SHIPPED | OUTPATIENT
Start: 2024-05-28

## 2024-05-28 RX ORDER — MULTIVITAMIN
1 TABLET ORAL DAILY
COMMUNITY

## 2024-05-28 NOTE — DISCHARGE INSTRUCTIONS
Information to Prepare you for your Surgery    PRE-ADMIT TESTING   [address] [facility]       Your surgery has been scheduled at [facility]. We are pleased to have the opportunity to serve you. For Further Information please call [phone].    On the day of surgery please report to the Information Desk on the 1st floor.    CONTACT YOUR PHYSICIAN'S OFFICE THE DAY PRIOR TO YOUR SURGERY TO OBTAIN YOUR ARRIVAL TIME.     The evening before surgery do not eat anything after 9 p.m. ( this includes hard candy, chewing gum and mints).  You may only have GATORADE, POWERADE AND WATER  from 9 p.m. until you leave your home.   DO NOT DRINK ANY LIQUIDS ON THE WAY TO THE HOSPITAL.      Why does your anesthesiologist allow you to drink Gatorade/Powerade before surgery?  Gatorade/Powerade helps to increase your comfort before surgery and to decrease your nausea after surgery.   The carbohydrates in Gatorade/Powerade help reduce your body's stress response to surgery.  If you are a diabetic-drink only water prior to surgery.    Outpatient Surgery- May allow 2 adults (18 and older)/ Support Persons (1 being the designated ) for all surgical/procedural patients. A breastfeeding mother will be allowed her infant and 2 adult Support Persons. No one under the age of 18 will be allowed in the building.      SPECIAL MEDICATION INSTRUCTIONS: TAKE medications checked off by the Anesthesiologist on your Medication List.    Surgery Patients:  If you take ASPIRIN - Your PHYSICIAN/SURGEON will need to inform you IF/OR when you need to stop taking aspirin prior to your surgery.     Starting the week prior to surgery, do not take any medications containing IBUPROFEN or NSAIDS (Advil, Aleve, BC, Goody's, Ketorolac, Meloxicam, Mobic, Motrin, Naproxen, Toradol, etc).  If you are not sure if you should take a medicine please call your surgeon's office.  You may take Tylenol.    Do Not Wear any make-up  (especially eye make-up) to surgery. Please remove any false eyelashes or eyelash extensions. If you arrive the day of surgery with makeup/eyelashes on you will be required to remove prior to surgery. (There is a risk of corneal abrasions if eye makeup/eyelash extensions are not removed)    Leave all valuables at home.   Do Not wear any jewelry or watches, including any metal in body piercings. Jewelry must be removed prior to coming to the hospital.  There is a possibility that rings that are unable to be removed may be cut off if they are on the surgical extremity.    Please remove all hair extensions, wigs, clips and any other metal accessories/ ornaments from your hair.  These items may pose a flammable/fire risk in Surgery and must be removed.    Do not shave your surgical area at least 5 days prior to your surgery. The surgical prep will be performed at the hospital according to Infection Control regulations.    Contact Lens must be removed before surgery. Either do not wear the contact lens or bring a case and solution for storage.  Please bring a container for eyeglasses or dentures as required.  Bring any paperwork your physician has provided, such as consent forms,  history and physicals, doctor's orders, etc.   Bring comfortable clothes that are loose fitting to wear upon discharge. Take into consideration the type of surgery being performed.  Maintain your diet as advised per your physician the day prior to surgery.    Adequate rest the night before surgery is advised.   Park in the Parking lot behind the hospital or in the [facility] across the street from the parking lot. Parking is complimentary.  If you will be discharged the same day as your procedure, please arrange for a responsible adult to drive you home or to accompany you if traveling by taxi.   YOU WILL NOT BE PERMITTED TO DRIVE OR TO LEAVE THE HOSPITAL ALONE AFTER SURGERY.   If you are being discharged the same day, it is  strongly recommended that you arrange for someone to remain with you for the first 24 hrs following your surgery.    The Surgeon will speak to your family/visitor after your surgery regarding the outcome of your surgery and post op care.  The Surgeon may speak to you after your surgery, but there is a possibility you may not remember the details.  Please check with your family members regarding the conversation with the Surgeon.    We strongly recommend whoever is bringing you home be present for discharge instructions.  This will ensure a thorough understanding for your post op home care.              Bathing Instructions with Hibiclens  Shower the evening before and morning of your procedure with Chlorhexidine (Hibiclens)    Do not use Chlorhexidine on your face or genitals. Do not get in your eyes.  Wash your face with water and your regular face wash/soap  Use your regular shampoo  Apply Chlorhexidine (Hibiclens) directly on your skin or on a wet washcloth and wash gently. When showering: Move away from the shower stream when applying Chlorhexidine (Hibiclens) to avoid rinsing off too soon.  Rinse thoroughly with warm water  Do not dilute Chlorhexidine (Hibiclens)   Dry off as usual, do not use any deodorant, powder, body lotions, perfume, after shave or cologne.

## 2024-05-28 NOTE — PROGRESS NOTES
Subjective:     Francisco Spencer is a 74 y.o. male with unremarkable past medical history. He denies risk factors for vascular disease. In early 11/2023 he began to experience chest pain when exercising like walking at a moderate pace or doing exercises at home. After a few days he had less pain with the pain only coming on if doing vigorous exercises. This continued into 12/2023. At about 5 am on 12/18/2023 he woke up from chest pain. He became mildly short of breath and began sweating. He presented to the emergency room at [place]. There was about 3 mm ST elevation in leads I and aVL. He received TNK and it was arranged for him to be transferred. As he was in the ambulance the chest pain resolved. He appears to have had a rhythm change at that time with AIVR. He was taken to [facility] for his further care. On arrival her the chest pain had resolved. He received heparin iv, aspirin and a clopidogrel load. On 12/18/2023 he had an echocardiogram. The left ventricular size was normal with overall mild systolic dysfunction. The mid anteroseptal and anterior walls, the apical septum and apex were severely hypokinetic. The remainder of the left ventricular walls contracted well. The ejection fraction was 45%. There was mild diastolic dysfunction. On 12/19/2023 he underwent coronary angiography. The left anterior descending coronary artery and the left circumflex coronary artery had separate ostea. The left anterior descending coronary artery had a 95% proximal lesion. The mid portion had a lengthy 80% lesion. The vessel was moderately calcified. The first diagonal  branch had an osteal 95% lesion. The left circumflex coronary artery had mild diseases. The right coronary artery was dominant with mild disease. The proximal lesion was stented with a drug eluting stent of 3.0 x 15 mm size dilated to 3.2 mm. The mid left anterior descending coronary artery was dilated with a 2.5 mm balloon and a  2.5 mm cutting balloon. It was not possible to cross with stent. He tried atorvastatin and rosuvastatin but felt the statin caused myalgia and he discontinued them. In 2/2024 he was noted to have a PSA level of 16. No exertional chest pain or exertional shortness of breath. No palpitations or weak spells. No bleeding. No issues with any of his prescribed medications. Feeling well overall. On 6/10/2024 there are plans for a prostate biopsy.      Coronary Artery Disease  Presents for follow-up visit. Pertinent negatives include no chest pain, chest pressure, chest tightness, dizziness, leg swelling, muscle weakness, palpitations, shortness of breath or weight gain. The symptoms have been stable.       Review of Systems   Constitutional: Negative for chills, fever, malaise/fatigue and weight gain.   HENT:  Negative for nosebleeds and tinnitus.    Eyes:  Negative for double vision, vision loss in left eye and vision loss in right eye.   Cardiovascular:  Negative for chest pain, claudication, dyspnea on exertion, irregular heartbeat, leg swelling, near-syncope, orthopnea, palpitations, paroxysmal nocturnal dyspnea and syncope.   Respiratory:  Negative for chest tightness, cough, hemoptysis, shortness of breath and wheezing.    Endocrine: Negative for cold intolerance and heat intolerance.   Hematologic/Lymphatic: Negative for bleeding problem. Does not bruise/bleed easily.   Skin:  Negative for color change and rash.   Musculoskeletal:  Negative for back pain, falls, muscle weakness and myalgias.   Gastrointestinal:  Negative for abdominal pain, diarrhea, dysphagia, heartburn, hematemesis, hematochezia, hemorrhoids, jaundice, melena, nausea and vomiting.   Genitourinary:  Negative for dysuria and hematuria.   Neurological:  Negative for dizziness, focal weakness, headaches, light-headedness, loss of balance, numbness, tremors, vertigo and weakness.   Psychiatric/Behavioral:  Negative for altered mental status, depression  "and memory loss. The patient is not nervous/anxious.    Allergic/Immunologic: Negative for hives and persistent infections.       Current Outpatient Medications on File Prior to Visit   Medication Sig Dispense Refill    aspirin (ECOTRIN) 81 MG EC tablet Take 1 tablet (81 mg total) by mouth once daily. 90 tablet 3    ciprofloxacin HCl (CIPRO) 500 MG tablet Take 1 tablet (500 mg total) by mouth 2 (two) times daily. for 4 doses 4 tablet 0    clopidogreL (PLAVIX) 75 mg tablet Take 1 tablet (75 mg total) by mouth once daily. 90 tablet 3    lisinopriL (PRINIVIL,ZESTRIL) 5 MG tablet Take 1 tablet (5 mg total) by mouth once daily. 90 tablet 3    metoprolol succinate (TOPROL-XL) 50 MG 24 hr tablet Take 1 tablet (50 mg total) by mouth once daily. 90 tablet 3     No current facility-administered medications on file prior to visit.        /69   Pulse 64   Ht 5' 10" (1.778 m)   Wt 78.8 kg (173 lb 11.6 oz)   SpO2 97%   BMI 24.93 kg/m²     Objective:     Physical Exam  Constitutional:       General: He is not in acute distress.     Appearance: Normal appearance. He is well-developed. He is not toxic-appearing or diaphoretic.   HENT:      Head: Normocephalic and atraumatic.      Nose: Nose normal.   Eyes:      General:         Right eye: No discharge.         Left eye: No discharge.      Conjunctiva/sclera:      Right eye: Right conjunctiva is not injected.      Left eye: Left conjunctiva is not injected.      Pupils: Pupils are equal.      Right eye: Pupil is round.      Left eye: Pupil is round.   Neck:      Thyroid: No thyromegaly.      Vascular: No carotid bruit or JVD.   Cardiovascular:      Rate and Rhythm: Normal rate and regular rhythm. No extrasystoles are present.     Chest Wall: PMI is not displaced.      Pulses:           Radial pulses are 2+ on the right side and 2+ on the left side.        Femoral pulses are 2+ on the right side and 2+ on the left side.       Dorsalis pedis pulses are 2+ on the right side " and 2+ on the left side.        Posterior tibial pulses are 2+ on the right side and 2+ on the left side.      Heart sounds: S1 normal and S2 normal.      No gallop.   Pulmonary:      Effort: Pulmonary effort is normal.      Breath sounds: Normal breath sounds.   Abdominal:      Palpations: Abdomen is soft.      Tenderness: There is no abdominal tenderness.   Musculoskeletal:      Cervical back: Neck supple.      Right lower leg: Normal. No swelling. No edema.      Left lower leg: Normal. No swelling. No edema.   Lymphadenopathy:      Head:      Right side of head: No submandibular adenopathy.      Left side of head: No submandibular adenopathy.      Cervical: No cervical adenopathy.   Skin:     General: Skin is warm and dry.      Findings: No rash.      Nails: There is no clubbing.   Neurological:      General: No focal deficit present.      Mental Status: He is alert and oriented to person, place, and time. He is not disoriented.      Cranial Nerves: No cranial nerve deficit.   Psychiatric:         Attention and Perception: Attention normal.         Mood and Affect: Mood and affect normal.         Speech: Speech normal.         Behavior: Behavior normal.         Thought Content: Thought content normal.         Cognition and Memory: Cognition and memory normal.         Judgment: Judgment normal.         Assessment:      1. Coronary artery disease involving native coronary artery of native heart without angina pectoris    2. History of myocardial infarction    3. History of coronary artery stent placement        Plan:     Coronary Artery Disease              11/2023: Began to experience angina.  12/18/2023: 5 AM: Chest pain at rest. Lateral ST elevation. Received TNK. CP resolved. AIVR. Troponin 45.  12/18/2023: Echo: Normal left ventricular size with overall mild systolic dysfunction. Mid anteroseptal and anterior walls, apical septum and apex severely hypokinetic. Remainder contract well. EF 45%. Mild diastolic  dysfunction.   12/19/2023: [facility]: LAD & LCX separate ostea. LAD: Proximal 95%. Mid lengthy 80%. Calcified. D1: Osteal 95%. LCX: Mild. RCA: Dominant. Mild. LAD: Proximal RUBEN 3.0 x 15 mm to 3.2 mm. Mid PTCA 2.5 mm balloon & 2.5 mm cutting balloon. Not possible to cross with stent.  12/20/2023: Reviewed finding during cath and implications with patient.  12/21/2023: Stressed the CRITICAL importance of 100% compliance especially with DAPT.  12/18/2023: Chol 176. HDL 36. . .  1/22/2024: Atorvastatin 80 mg Q24 was changed to rosuvastatin 20 mg Q24. Myalgia on statin and stopped statin.  2/12/2024: Chol 172. HDL 43. . . [Off statin].  On metoprolol 50 mg Q24 and lisinopril 5 mg Q24.  On aspirin 81 mg Q24.  On clopidogrel 75 mg Q24 for 12 months to 12/31/2024.  5/28/2024: Rosuvastatin 5 mg Q24 Q24 to be tried.    2. PSA Elevation   2/12/2024: PSA 16.   Dr. Danny Cisneros.   6/10/2024: Plan is prostate biopsy.    Clopidogrel 75 mg Q24 to discontinued 7 days before planned biopsy with low risk.    3. Primary Care   Dr. Enriqueta Gonzalez.    F/u 3 months.    Grace Panda M.D.

## 2024-05-29 ENCOUNTER — PATIENT MESSAGE (OUTPATIENT)
Dept: CARDIOLOGY | Facility: CLINIC | Age: 74
End: 2024-05-29
Payer: MEDICARE

## 2024-06-04 ENCOUNTER — TELEPHONE (OUTPATIENT)
Dept: UROLOGY | Facility: CLINIC | Age: 74
End: 2024-06-04
Payer: MEDICARE

## 2024-06-04 NOTE — TELEPHONE ENCOUNTER
----- Message from Cindy Farrell RN sent at 6/4/2024 11:59 AM CDT -----  Regarding: FW: Patient Advice  Has bx scheduled for 6/10 in OR. Please return call when can.  ----- Message -----  From: Symone Pal  Sent: 6/4/2024   9:57 AM CDT  To: [facility] Del[facility] Staff  Subject: Patient Advice                                                     Name of Who is Calling:  SUAD ESCAMILLA    Who Left The Message:  SUAD ESCAMILLA          What is the request in detail:       Patient called requesting to have blood work prior to his procedure on Monday 06/10/2024. Patient is requesting  PSA orders.  Patient also has questions and concerns.  Please give a call back at your earliest convenience and further advise.    Thank you      Reply by [facility]:   YES      Preferred Call Back :   [phone]

## 2024-06-07 ENCOUNTER — TELEPHONE (OUTPATIENT)
Dept: UROLOGY | Facility: CLINIC | Age: 74
End: 2024-06-07
Payer: MEDICARE

## 2024-06-07 NOTE — TELEPHONE ENCOUNTER
Called to confirm arrival time of 5:00 for upcoming biopsy- patient states he cannot arrive any earlier than 9:00. Case moved w/Rosy. Confirmed arrival time of 9:00 am w/patient. Reviewed instructions.    Patient would like a PSA level drawn the am of his procedure. He states he has tried multiple supplements for elevated PSA without success, but wants to see if the colloidal silver he has been taking has lowered his PSA level.

## 2024-10-01 ENCOUNTER — OFFICE VISIT (OUTPATIENT)
Dept: CARDIOLOGY | Facility: CLINIC | Age: 74
End: 2024-10-01
Attending: INTERNAL MEDICINE
Payer: MEDICARE

## 2024-10-01 VITALS
DIASTOLIC BLOOD PRESSURE: 67 MMHG | OXYGEN SATURATION: 100 % | SYSTOLIC BLOOD PRESSURE: 115 MMHG | WEIGHT: 178.38 LBS | HEIGHT: 70 IN | BODY MASS INDEX: 25.54 KG/M2 | HEART RATE: 60 BPM

## 2024-10-01 DIAGNOSIS — Z95.5 HISTORY OF CORONARY ARTERY STENT PLACEMENT: ICD-10-CM

## 2024-10-01 DIAGNOSIS — I25.10 CORONARY ARTERY DISEASE INVOLVING NATIVE CORONARY ARTERY OF NATIVE HEART WITHOUT ANGINA PECTORIS: ICD-10-CM

## 2024-10-01 DIAGNOSIS — I25.2 HISTORY OF MYOCARDIAL INFARCTION: ICD-10-CM

## 2024-10-01 PROCEDURE — 3288F FALL RISK ASSESSMENT DOCD: CPT | Mod: CPTII,S$GLB,, | Performed by: INTERNAL MEDICINE

## 2024-10-01 PROCEDURE — 99214 OFFICE O/P EST MOD 30 MIN: CPT | Mod: S$GLB,,, | Performed by: INTERNAL MEDICINE

## 2024-10-01 PROCEDURE — 4010F ACE/ARB THERAPY RXD/TAKEN: CPT | Mod: CPTII,S$GLB,, | Performed by: INTERNAL MEDICINE

## 2024-10-01 PROCEDURE — 1126F AMNT PAIN NOTED NONE PRSNT: CPT | Mod: CPTII,S$GLB,, | Performed by: INTERNAL MEDICINE

## 2024-10-01 PROCEDURE — 3074F SYST BP LT 130 MM HG: CPT | Mod: CPTII,S$GLB,, | Performed by: INTERNAL MEDICINE

## 2024-10-01 PROCEDURE — 1160F RVW MEDS BY RX/DR IN RCRD: CPT | Mod: CPTII,S$GLB,, | Performed by: INTERNAL MEDICINE

## 2024-10-01 PROCEDURE — 3008F BODY MASS INDEX DOCD: CPT | Mod: CPTII,S$GLB,, | Performed by: INTERNAL MEDICINE

## 2024-10-01 PROCEDURE — 1159F MED LIST DOCD IN RCRD: CPT | Mod: CPTII,S$GLB,, | Performed by: INTERNAL MEDICINE

## 2024-10-01 PROCEDURE — 99999 PR PBB SHADOW E&M-EST. PATIENT-LVL III: CPT | Mod: PBBFAC,,, | Performed by: INTERNAL MEDICINE

## 2024-10-01 PROCEDURE — 1100F PTFALLS ASSESS-DOCD GE2>/YR: CPT | Mod: CPTII,S$GLB,, | Performed by: INTERNAL MEDICINE

## 2024-10-01 PROCEDURE — 3078F DIAST BP <80 MM HG: CPT | Mod: CPTII,S$GLB,, | Performed by: INTERNAL MEDICINE

## 2024-10-01 RX ORDER — METOPROLOL SUCCINATE 50 MG/1
50 TABLET, EXTENDED RELEASE ORAL DAILY
Qty: 90 TABLET | Refills: 3 | Status: SHIPPED | OUTPATIENT
Start: 2024-10-01

## 2024-10-01 RX ORDER — LISINOPRIL 5 MG/1
5 TABLET ORAL DAILY
Qty: 90 TABLET | Refills: 3 | Status: SHIPPED | OUTPATIENT
Start: 2024-10-01

## 2024-10-01 RX ORDER — ASPIRIN 81 MG/1
81 TABLET ORAL DAILY
Qty: 90 TABLET | Refills: 3 | Status: SHIPPED | OUTPATIENT
Start: 2024-10-01

## 2024-10-01 NOTE — PROGRESS NOTES
Subjective:     Francisco Spencer is a 74 y.o. male with unremarkable past medical history. He denies risk factors for vascular disease. In early 11/2023 he began to experience chest pain when exercising like walking at a moderate pace or doing exercises at home. After a few days he had less pain with the pain only coming on if doing vigorous exercises. This continued into 12/2023. At about 5 am on 12/18/2023 he woke up from chest pain. He became mildly short of breath and began sweating. He presented to the emergency room at [place]. There was about 3 mm ST elevation in leads I and aVL. He received TNK and it was arranged for him to be transferred. As he was in the ambulance the chest pain resolved. He appears to have had a rhythm change at that time with AIVR. He was taken to [facility] for his further care. On arrival her the chest pain had resolved. He received heparin iv, aspirin and a clopidogrel load. On 12/18/2023 he had an echocardiogram. The left ventricular size was normal with overall mild systolic dysfunction. The mid anteroseptal and anterior walls, the apical septum and apex were severely hypokinetic. The remainder of the left ventricular walls contracted well. The ejection fraction was 45%. There was mild diastolic dysfunction. On 12/19/2023 he underwent coronary angiography. The left anterior descending coronary artery and the left circumflex coronary artery had separate ostea. The left anterior descending coronary artery had a 95% proximal lesion. The mid portion had a lengthy 80% lesion. The vessel was moderately calcified. The first diagonal  branch had an osteal 95% lesion. The left circumflex coronary artery had mild diseases. The right coronary artery was dominant with mild disease. The proximal lesion was stented with a drug eluting stent of 3.0 x 15 mm size dilated to 3.2 mm. The mid left anterior descending coronary artery was dilated with a 2.5 mm balloon and a  2.5 mm cutting balloon. It was not possible to cross with stent. He tried atorvastatin and rosuvastatin but felt the statin caused myalgia and he discontinued them. In 2/2024 he was noted to have a PSA level of 16. On 6/10/2024 he had plans for a prostate biopsy and discontinued the clopidogrel. He never went for the biopsy. No exertional chest pain or exertional shortness of breath. No palpitations or weak spells. No bleeding. No issues with any of his prescribed medications. Feeling well overall.       Coronary Artery Disease  Presents for follow-up visit. Pertinent negatives include no chest pain, chest pressure, chest tightness, dizziness, leg swelling, muscle weakness, palpitations, shortness of breath or weight gain. The symptoms have been stable.       Review of Systems   Constitutional: Negative for chills, fever, malaise/fatigue and weight gain.   HENT:  Negative for nosebleeds and tinnitus.    Eyes:  Negative for double vision, vision loss in left eye and vision loss in right eye.   Cardiovascular:  Negative for chest pain, claudication, dyspnea on exertion, irregular heartbeat, leg swelling, near-syncope, orthopnea, palpitations, paroxysmal nocturnal dyspnea and syncope.   Respiratory:  Negative for chest tightness, cough, hemoptysis, shortness of breath and wheezing.    Endocrine: Negative for cold intolerance and heat intolerance.   Hematologic/Lymphatic: Negative for bleeding problem. Does not bruise/bleed easily.   Skin:  Negative for color change and rash.   Musculoskeletal:  Negative for back pain, falls, muscle weakness and myalgias.   Gastrointestinal:  Negative for abdominal pain, diarrhea, dysphagia, heartburn, hematemesis, hematochezia, hemorrhoids, jaundice, melena, nausea and vomiting.   Genitourinary:  Negative for dysuria and hematuria.   Neurological:  Negative for dizziness, focal weakness, headaches, light-headedness, loss of balance, numbness, tremors, vertigo and weakness.  "  Psychiatric/Behavioral:  Negative for altered mental status, depression and memory loss. The patient is not nervous/anxious.    Allergic/Immunologic: Negative for hives and persistent infections.       Current Outpatient Medications on File Prior to Visit   Medication Sig Dispense Refill    ascorbic acid (VITAMIN C ORAL) Take by mouth once daily.      aspirin (ECOTRIN) 81 MG EC tablet Take 1 tablet (81 mg total) by mouth once daily. 90 tablet 3    b complex vitamins capsule Take 1 capsule by mouth once daily.      cholecalciferol, vitamin D3, (VITAMIN D3 ORAL) Take by mouth once daily.      KRILL OIL ORAL Take by mouth once daily.      lisinopriL (PRINIVIL,ZESTRIL) 5 MG tablet Take 1 tablet (5 mg total) by mouth once daily. 90 tablet 3    MAGNESIUM ORAL Take by mouth once daily.      metoprolol succinate (TOPROL-XL) 50 MG 24 hr tablet Take 1 tablet (50 mg total) by mouth once daily. 90 tablet 3    multivitamin (THERAGRAN) per tablet Take 1 tablet by mouth once daily.      rosuvastatin (CRESTOR) 5 MG tablet Take 1 tablet (5 mg total) by mouth once daily. 90 tablet 3    UNABLE TO FIND Take by mouth once daily. Colloidal Silver      clopidogreL (PLAVIX) 75 mg tablet Take 1 tablet (75 mg total) by mouth once daily. (Patient not taking: Reported on 10/1/2024) 90 tablet 3     No current facility-administered medications on file prior to visit.        /67   Pulse 60   Ht 5' 10" (1.778 m)   Wt 80.9 kg (178 lb 5.6 oz)   SpO2 100%   BMI 25.59 kg/m²     Objective:     Physical Exam  Constitutional:       General: He is not in acute distress.     Appearance: Normal appearance. He is well-developed. He is not toxic-appearing or diaphoretic.   HENT:      Head: Normocephalic and atraumatic.      Nose: Nose normal.   Eyes:      General:         Right eye: No discharge.         Left eye: No discharge.      Conjunctiva/sclera:      Right eye: Right conjunctiva is not injected.      Left eye: Left conjunctiva is not " injected.      Pupils: Pupils are equal.      Right eye: Pupil is round.      Left eye: Pupil is round.   Neck:      Thyroid: No thyromegaly.      Vascular: No carotid bruit or JVD.   Cardiovascular:      Rate and Rhythm: Normal rate and regular rhythm. No extrasystoles are present.     Chest Wall: PMI is not displaced.      Pulses:           Radial pulses are 2+ on the right side and 2+ on the left side.        Femoral pulses are 2+ on the right side and 2+ on the left side.       Dorsalis pedis pulses are 2+ on the right side and 2+ on the left side.        Posterior tibial pulses are 2+ on the right side and 2+ on the left side.      Heart sounds: S1 normal and S2 normal.      No gallop.   Pulmonary:      Effort: Pulmonary effort is normal.      Breath sounds: Normal breath sounds.   Abdominal:      Palpations: Abdomen is soft.      Tenderness: There is no abdominal tenderness.   Musculoskeletal:      Cervical back: Neck supple.      Right lower leg: Normal. No swelling. No edema.      Left lower leg: Normal. No swelling. No edema.   Lymphadenopathy:      Head:      Right side of head: No submandibular adenopathy.      Left side of head: No submandibular adenopathy.      Cervical: No cervical adenopathy.   Skin:     General: Skin is warm and dry.      Findings: No rash.      Nails: There is no clubbing.   Neurological:      General: No focal deficit present.      Mental Status: He is alert and oriented to person, place, and time. He is not disoriented.      Cranial Nerves: No cranial nerve deficit.   Psychiatric:         Attention and Perception: Attention normal.         Mood and Affect: Mood and affect normal.         Speech: Speech normal.         Behavior: Behavior normal.         Thought Content: Thought content normal.         Cognition and Memory: Cognition and memory normal.         Judgment: Judgment normal.         Assessment:      1. Coronary artery disease involving native coronary artery of native  heart without angina pectoris    2. History of myocardial infarction    3. History of coronary artery stent placement        Plan:     Coronary Artery Disease              11/2023: Began to experience angina.  12/18/2023: 5 AM: Chest pain at rest. Lateral ST elevation. Received TNK. CP resolved. AIVR. Troponin 45.  12/18/2023: Echo: Normal left ventricular size with overall mild systolic dysfunction. Mid anteroseptal and anterior walls, apical septum and apex severely hypokinetic. Remainder contract well. EF 45%. Mild diastolic dysfunction.   12/19/2023: [facility]: LAD & LCX separate ostea. LAD: Proximal 95%. Mid lengthy 80%. Calcified. D1: Osteal 95%. LCX: Mild. RCA: Dominant. Mild. LAD: Proximal RUBEN 3.0 x 15 mm to 3.2 mm. Mid PTCA 2.5 mm balloon & 2.5 mm cutting balloon. Not possible to cross with stent.  12/20/2023: Reviewed finding during cath and implications with patient.  12/21/2023: Stressed the CRITICAL importance of 100% compliance especially with DAPT.  12/18/2023: Chol 176. HDL 36. . .  1/22/2024: Atorvastatin 80 mg Q24 was changed to rosuvastatin 20 mg Q24. Myalgia on statin and stopped statin.  2/12/2024: Chol 172. HDL 43. . . [Off statin].  5/28/2024: Rosuvastatin 5 mg Q24 was Rx but he decided not to take a statin.  6/1/2024: Clopidogrel 75 mg Q24 was discontinued due to plans for biopsy of the prostate that he did not have.  On metoprolol 50 mg Q24 and lisinopril 5 mg Q24.  On aspirin 81 mg Q24.  Appears stable.    2. PSA Elevation   2/12/2024: PSA 16.   6/10/2024: Plan was prostate biopsy but he decided not to have it done.    Dr. Danny Cisneros.     3. Primary Care   Dr. Enriqueta Gonzalez.    F/u 4 months.    Grace Panda M.D.

## 2025-02-03 ENCOUNTER — OFFICE VISIT (OUTPATIENT)
Dept: CARDIOLOGY | Facility: CLINIC | Age: 75
End: 2025-02-03
Attending: INTERNAL MEDICINE
Payer: MEDICARE

## 2025-02-03 VITALS
WEIGHT: 179.81 LBS | BODY MASS INDEX: 25.74 KG/M2 | SYSTOLIC BLOOD PRESSURE: 128 MMHG | HEIGHT: 70 IN | DIASTOLIC BLOOD PRESSURE: 84 MMHG | OXYGEN SATURATION: 99 % | HEART RATE: 83 BPM

## 2025-02-03 DIAGNOSIS — I25.2 HISTORY OF MYOCARDIAL INFARCTION: ICD-10-CM

## 2025-02-03 DIAGNOSIS — I25.10 CORONARY ARTERY DISEASE INVOLVING NATIVE CORONARY ARTERY OF NATIVE HEART WITHOUT ANGINA PECTORIS: ICD-10-CM

## 2025-02-03 DIAGNOSIS — Z95.5 HISTORY OF CORONARY ARTERY STENT PLACEMENT: ICD-10-CM

## 2025-02-03 PROCEDURE — 3074F SYST BP LT 130 MM HG: CPT | Mod: CPTII,S$GLB,, | Performed by: INTERNAL MEDICINE

## 2025-02-03 PROCEDURE — 99214 OFFICE O/P EST MOD 30 MIN: CPT | Mod: 25,S$GLB,, | Performed by: INTERNAL MEDICINE

## 2025-02-03 PROCEDURE — 1125F AMNT PAIN NOTED PAIN PRSNT: CPT | Mod: CPTII,S$GLB,, | Performed by: INTERNAL MEDICINE

## 2025-02-03 PROCEDURE — 3288F FALL RISK ASSESSMENT DOCD: CPT | Mod: CPTII,S$GLB,, | Performed by: INTERNAL MEDICINE

## 2025-02-03 PROCEDURE — 93000 ELECTROCARDIOGRAM COMPLETE: CPT | Mod: S$GLB,,, | Performed by: INTERNAL MEDICINE

## 2025-02-03 PROCEDURE — 1101F PT FALLS ASSESS-DOCD LE1/YR: CPT | Mod: CPTII,S$GLB,, | Performed by: INTERNAL MEDICINE

## 2025-02-03 PROCEDURE — 1160F RVW MEDS BY RX/DR IN RCRD: CPT | Mod: CPTII,S$GLB,, | Performed by: INTERNAL MEDICINE

## 2025-02-03 PROCEDURE — 3008F BODY MASS INDEX DOCD: CPT | Mod: CPTII,S$GLB,, | Performed by: INTERNAL MEDICINE

## 2025-02-03 PROCEDURE — 1159F MED LIST DOCD IN RCRD: CPT | Mod: CPTII,S$GLB,, | Performed by: INTERNAL MEDICINE

## 2025-02-03 PROCEDURE — 99999 PR PBB SHADOW E&M-EST. PATIENT-LVL III: CPT | Mod: PBBFAC,,, | Performed by: INTERNAL MEDICINE

## 2025-02-03 PROCEDURE — 93005 ELECTROCARDIOGRAM TRACING: CPT

## 2025-02-03 PROCEDURE — 3079F DIAST BP 80-89 MM HG: CPT | Mod: CPTII,S$GLB,, | Performed by: INTERNAL MEDICINE

## 2025-02-03 RX ORDER — ROSUVASTATIN CALCIUM 10 MG/1
10 TABLET, COATED ORAL DAILY
Qty: 90 TABLET | Refills: 3 | Status: SHIPPED | OUTPATIENT
Start: 2025-02-03

## 2025-02-03 RX ORDER — ASPIRIN 81 MG/1
81 TABLET ORAL DAILY
Qty: 90 TABLET | Refills: 3 | Status: SHIPPED | OUTPATIENT
Start: 2025-02-03

## 2025-02-03 RX ORDER — LISINOPRIL 5 MG/1
5 TABLET ORAL DAILY
Qty: 90 TABLET | Refills: 3 | Status: SHIPPED | OUTPATIENT
Start: 2025-02-03

## 2025-02-03 NOTE — PROGRESS NOTES
Subjective:     Francisco Spencer is a 74 y.o. male with unremarkable past medical history. He denies risk factors for vascular disease. In early 11/2023 he began to experience chest pain when exercising like walking at a moderate pace or doing exercises at home. After a few days he had less pain with the pain only coming on if doing vigorous exercises. This continued into 12/2023. At about 5 am on 12/18/2023 he woke up from chest pain. He became mildly short of breath and began sweating. He presented to the emergency room at [place]. There was about 3 mm ST elevation in leads I and aVL. He received TNK and it was arranged for him to be transferred. As he was in the ambulance the chest pain resolved. He appears to have had a rhythm change at that time with AIVR. He was taken to [facility] for his further care. On arrival her the chest pain had resolved. He received heparin iv, aspirin and a clopidogrel load. On 12/18/2023 he had an echocardiogram. The left ventricular size was normal with overall mild systolic dysfunction. The mid anteroseptal and anterior walls, the apical septum and apex were severely hypokinetic. The remainder of the left ventricular walls contracted well. The ejection fraction was 45%. There was mild diastolic dysfunction. On 12/19/2023 he underwent coronary angiography. The left anterior descending coronary artery and the left circumflex coronary artery had separate ostea. The left anterior descending coronary artery had a 95% proximal lesion. The mid portion had a lengthy 80% lesion. The vessel was moderately calcified. The first diagonal  branch had an osteal 95% lesion. The left circumflex coronary artery had mild diseases. The right coronary artery was dominant with mild disease. The proximal lesion was stented with a drug eluting stent of 3.0 x 15 mm size dilated to 3.2 mm. The mid left anterior descending coronary artery was dilated with a 2.5 mm balloon and a  2.5 mm cutting balloon. It was not possible to cross with stent. He tried atorvastatin and rosuvastatin but felt the statin caused myalgia and he discontinued them. In 2/2024 he was noted to have a PSA level of 16. On 6/10/2024 he had plans for a prostate biopsy and discontinued the clopidogrel. He never went for the biopsy. No exertional chest pain or exertional shortness of breath. No palpitations or weak spells. No bleeding. No issues with any of his prescribed medications. Feeling well overall.       Coronary Artery Disease  Presents for follow-up visit. Pertinent negatives include no chest pain, chest pressure, chest tightness, dizziness, leg swelling, muscle weakness, palpitations, shortness of breath or weight gain. The symptoms have been stable.       Review of Systems   Constitutional: Negative for chills, fever, malaise/fatigue and weight gain.   HENT:  Negative for nosebleeds and tinnitus.    Eyes:  Negative for double vision, vision loss in left eye and vision loss in right eye.   Cardiovascular:  Negative for chest pain, claudication, dyspnea on exertion, irregular heartbeat, leg swelling, near-syncope, orthopnea, palpitations, paroxysmal nocturnal dyspnea and syncope.   Respiratory:  Negative for chest tightness, cough, hemoptysis, shortness of breath and wheezing.    Endocrine: Negative for cold intolerance and heat intolerance.   Hematologic/Lymphatic: Negative for bleeding problem. Does not bruise/bleed easily.   Skin:  Negative for color change and rash.   Musculoskeletal:  Negative for back pain, falls, muscle weakness and myalgias.   Gastrointestinal:  Negative for abdominal pain, diarrhea, dysphagia, heartburn, hematemesis, hematochezia, hemorrhoids, jaundice, melena, nausea and vomiting.   Genitourinary:  Negative for dysuria and hematuria.   Neurological:  Negative for dizziness, focal weakness, headaches, light-headedness, loss of balance, numbness, tremors, vertigo and weakness.  "  Psychiatric/Behavioral:  Negative for altered mental status, depression and memory loss. The patient is not nervous/anxious.    Allergic/Immunologic: Negative for hives and persistent infections.       Current Outpatient Medications on File Prior to Visit   Medication Sig Dispense Refill    ascorbic acid (VITAMIN C ORAL) Take by mouth once daily. Takes every couple of days      aspirin (ECOTRIN) 81 MG EC tablet Take 1 tablet (81 mg total) by mouth once daily. 90 tablet 3    b complex vitamins capsule Take 1 capsule by mouth once daily.      cholecalciferol, vitamin D3, (VITAMIN D3 ORAL) Take by mouth once daily.      KRILL OIL ORAL Take by mouth once daily.      lisinopriL (PRINIVIL,ZESTRIL) 5 MG tablet Take 1 tablet (5 mg total) by mouth once daily. (Patient taking differently: Take 5 mg by mouth once daily. Takes every 2-3 days) 90 tablet 3    MAGNESIUM ORAL Take by mouth once daily.      metoprolol succinate (TOPROL-XL) 50 MG 24 hr tablet Take 1 tablet (50 mg total) by mouth once daily. (Patient taking differently: Take 50 mg by mouth once daily. Takes every 2-3 days) 90 tablet 3    multivitamin (THERAGRAN) per tablet Take 1 tablet by mouth once daily.      UNABLE TO FIND medication name: Brite eyes      clopidogreL (PLAVIX) 75 mg tablet Take 1 tablet (75 mg total) by mouth once daily. (Patient not taking: Reported on 10/1/2024) 90 tablet 3    UNABLE TO FIND Take by mouth once daily. Colloidal Silver      UNABLE TO FIND Chelation EDTA  Helps to remove heavy metals       No current facility-administered medications on file prior to visit.        /84   Pulse 83   Ht 5' 10" (1.778 m)   Wt 81.5 kg (179 lb 12.6 oz)   SpO2 99%   BMI 25.80 kg/m²     Objective:     Physical Exam  Constitutional:       General: He is not in acute distress.     Appearance: Normal appearance. He is well-developed. He is not toxic-appearing or diaphoretic.   HENT:      Head: Normocephalic and atraumatic.      Nose: Nose normal. "   Eyes:      General:         Right eye: No discharge.         Left eye: No discharge.      Conjunctiva/sclera:      Right eye: Right conjunctiva is not injected.      Left eye: Left conjunctiva is not injected.      Pupils: Pupils are equal.      Right eye: Pupil is round.      Left eye: Pupil is round.   Neck:      Thyroid: No thyromegaly.      Vascular: No carotid bruit or JVD.   Cardiovascular:      Rate and Rhythm: Normal rate and regular rhythm. No extrasystoles are present.     Chest Wall: PMI is not displaced.      Pulses:           Radial pulses are 2+ on the right side and 2+ on the left side.        Femoral pulses are 2+ on the right side and 2+ on the left side.       Dorsalis pedis pulses are 2+ on the right side and 2+ on the left side.        Posterior tibial pulses are 2+ on the right side and 2+ on the left side.      Heart sounds: S1 normal and S2 normal.      No gallop.   Pulmonary:      Effort: Pulmonary effort is normal.      Breath sounds: Normal breath sounds.   Abdominal:      Palpations: Abdomen is soft.      Tenderness: There is no abdominal tenderness.   Musculoskeletal:      Cervical back: Neck supple.      Right lower leg: Normal. No swelling. No edema.      Left lower leg: Normal. No swelling. No edema.   Lymphadenopathy:      Head:      Right side of head: No submandibular adenopathy.      Left side of head: No submandibular adenopathy.      Cervical: No cervical adenopathy.   Skin:     General: Skin is warm and dry.      Findings: No rash.      Nails: There is no clubbing.   Neurological:      General: No focal deficit present.      Mental Status: He is alert and oriented to person, place, and time. He is not disoriented.      Cranial Nerves: No cranial nerve deficit.   Psychiatric:         Attention and Perception: Attention normal.         Mood and Affect: Mood and affect normal.         Speech: Speech normal.         Behavior: Behavior normal.         Thought Content: Thought  content normal.         Cognition and Memory: Cognition and memory normal.         Judgment: Judgment normal.         Assessment:      1. Coronary artery disease involving native coronary artery of native heart without angina pectoris    2. History of myocardial infarction    3. History of coronary artery stent placement        Plan:     Coronary Artery Disease              11/2023: Began to experience angina.  12/18/2023: 5 AM: Chest pain at rest. Lateral ST elevation. Received TNK. CP resolved. AIVR. Troponin 45.  12/18/2023: Echo: Normal left ventricular size with overall mild systolic dysfunction. Mid anteroseptal and anterior walls, apical septum and apex severely hypokinetic. Remainder contract well. EF 45%. Mild diastolic dysfunction.   12/19/2023: [facility]: LAD & LCX separate ostea. LAD: Proximal 95%. Mid lengthy 80%. Calcified. D1: Osteal 95%. LCX: Mild. RCA: Dominant. Mild. LAD: Proximal RUBEN 3.0 x 15 mm to 3.2 mm. Mid PTCA 2.5 mm balloon & 2.5 mm cutting balloon. Not possible to cross with stent.  12/20/2023: Reviewed finding during cath and implications with patient.  12/21/2023: Stressed the CRITICAL importance of 100% compliance especially with DAPT.  12/18/2023: Chol 176. HDL 36. . .  1/22/2024: Atorvastatin 80 mg Q24 was changed to rosuvastatin 20 mg Q24. Myalgia on statin and stopped statin.  2/12/2024: Chol 172. HDL 43. . . [Off statin].  5/28/2024: Rosuvastatin 5 mg Q24 was Rx but he decided not to take a statin.  6/1/2024: Clopidogrel 75 mg Q24 was discontinued due to plans for biopsy of the prostate that he did not have.  On metoprolol 50 mg Q24 and lisinopril 5 mg Q24 but he takes the pills about every 3 days as he feels the combination causes aches and pains.  On aspirin 81 mg Q24.  Appears stable.  2/3/2025: No metoprolol 50 mg Q24 but take lisinopril 5 mg Q24. Rosuvastatin 10 mg Q24 to begin in 1 week.    2. PSA Elevation   2/12/2024: PSA 16.   6/10/2024: Plan was  prostate biopsy but he decided not to have it done.    Dr. Danny Cisneros.     3. Primary Care   Dr. Enriqueta Gonzalez.    F/u 4 months.    Grace Panda M.D.

## 2025-02-04 LAB
OHS QRS DURATION: 94 MS
OHS QTC CALCULATION: 419 MS

## 2025-08-04 ENCOUNTER — OFFICE VISIT (OUTPATIENT)
Dept: CARDIOLOGY | Facility: CLINIC | Age: 75
End: 2025-08-04
Attending: INTERNAL MEDICINE
Payer: MEDICARE

## 2025-08-04 VITALS
HEART RATE: 71 BPM | SYSTOLIC BLOOD PRESSURE: 128 MMHG | DIASTOLIC BLOOD PRESSURE: 64 MMHG | WEIGHT: 180.25 LBS | BODY MASS INDEX: 25.8 KG/M2 | HEIGHT: 70 IN | OXYGEN SATURATION: 98 %

## 2025-08-04 DIAGNOSIS — Z95.5 HISTORY OF CORONARY ARTERY STENT PLACEMENT: ICD-10-CM

## 2025-08-04 DIAGNOSIS — I25.10 CORONARY ARTERY DISEASE INVOLVING NATIVE CORONARY ARTERY OF NATIVE HEART WITHOUT ANGINA PECTORIS: ICD-10-CM

## 2025-08-04 DIAGNOSIS — I25.2 HISTORY OF MYOCARDIAL INFARCTION: ICD-10-CM

## 2025-08-04 PROCEDURE — 3074F SYST BP LT 130 MM HG: CPT | Mod: CPTII,S$GLB,, | Performed by: INTERNAL MEDICINE

## 2025-08-04 PROCEDURE — 3078F DIAST BP <80 MM HG: CPT | Mod: CPTII,S$GLB,, | Performed by: INTERNAL MEDICINE

## 2025-08-04 PROCEDURE — 99214 OFFICE O/P EST MOD 30 MIN: CPT | Mod: S$GLB,,, | Performed by: INTERNAL MEDICINE

## 2025-08-04 PROCEDURE — 3008F BODY MASS INDEX DOCD: CPT | Mod: CPTII,S$GLB,, | Performed by: INTERNAL MEDICINE

## 2025-08-04 PROCEDURE — 1126F AMNT PAIN NOTED NONE PRSNT: CPT | Mod: CPTII,S$GLB,, | Performed by: INTERNAL MEDICINE

## 2025-08-04 PROCEDURE — 99999 PR PBB SHADOW E&M-EST. PATIENT-LVL III: CPT | Mod: PBBFAC,,, | Performed by: INTERNAL MEDICINE

## 2025-08-04 PROCEDURE — 3288F FALL RISK ASSESSMENT DOCD: CPT | Mod: CPTII,S$GLB,, | Performed by: INTERNAL MEDICINE

## 2025-08-04 PROCEDURE — 1159F MED LIST DOCD IN RCRD: CPT | Mod: CPTII,S$GLB,, | Performed by: INTERNAL MEDICINE

## 2025-08-04 PROCEDURE — 1100F PTFALLS ASSESS-DOCD GE2>/YR: CPT | Mod: CPTII,S$GLB,, | Performed by: INTERNAL MEDICINE

## 2025-08-04 PROCEDURE — 1160F RVW MEDS BY RX/DR IN RCRD: CPT | Mod: CPTII,S$GLB,, | Performed by: INTERNAL MEDICINE

## 2025-08-04 PROCEDURE — 4010F ACE/ARB THERAPY RXD/TAKEN: CPT | Mod: CPTII,S$GLB,, | Performed by: INTERNAL MEDICINE

## 2025-08-04 RX ORDER — ASPIRIN 81 MG/1
81 TABLET ORAL DAILY
Qty: 90 TABLET | Refills: 3 | Status: SHIPPED | OUTPATIENT
Start: 2025-08-04

## 2025-08-04 NOTE — PROGRESS NOTES
Subjective:     Francisco Spencer is a 75 y.o. male with unremarkable past medical history. He denies risk factors for vascular disease. In early 11/2023 he began to experience chest pain when exercising like walking at a moderate pace or doing exercises at home. After a few days he had less pain with the pain only coming on if doing vigorous exercises. This continued into 12/2023. At about 5 am on 12/18/2023 he woke up from chest pain. He became mildly short of breath and began sweating. He presented to the emergency room at [place]. There was about 3 mm ST elevation in leads I and aVL. He received TNK and it was arranged for him to be transferred. As he was in the ambulance the chest pain resolved. He appears to have had a rhythm change at that time with AIVR. He was taken to [facility] for his further care. On arrival her the chest pain had resolved. He received heparin iv, aspirin and a clopidogrel load. On 12/18/2023 he had an echocardiogram. The left ventricular size was normal with overall mild systolic dysfunction. The mid anteroseptal and anterior walls, the apical septum and apex were severely hypokinetic. The remainder of the left ventricular walls contracted well. The ejection fraction was 45%. There was mild diastolic dysfunction. On 12/19/2023 he underwent coronary angiography. The left anterior descending coronary artery and the left circumflex coronary artery had separate ostea. The left anterior descending coronary artery had a 95% proximal lesion. The mid portion had a lengthy 80% lesion. The vessel was moderately calcified. The first diagonal  branch had an osteal 95% lesion. The left circumflex coronary artery had mild diseases. The right coronary artery was dominant with mild disease. The proximal lesion was stented with a drug eluting stent of 3.0 x 15 mm size dilated to 3.2 mm. The mid left anterior descending coronary artery was dilated with a 2.5 mm balloon and a  2.5 mm cutting balloon. It was not possible to cross with stent. He tried atorvastatin and rosuvastatin but felt the statin caused myalgia and he discontinued them. In 2/2024 he was noted to have a PSA level of 16. On 6/10/2024 he had plans for a prostate biopsy and discontinued the clopidogrel. He never went for the biopsy. No exertional chest pain or exertional shortness of breath. No palpitations or weak spells. No bleeding. No issues with any of his prescribed medications. Feeling well overall.    Coronary Artery Disease  Presents for follow-up visit. Pertinent negatives include no chest pain, chest pressure, chest tightness, dizziness, leg swelling, muscle weakness, palpitations, shortness of breath or weight gain. The symptoms have been stable.     Review of Systems   Constitutional: Negative for chills, fever, malaise/fatigue and weight gain.   HENT:  Negative for nosebleeds and tinnitus.    Eyes:  Negative for double vision, vision loss in left eye and vision loss in right eye.   Cardiovascular:  Negative for chest pain, claudication, dyspnea on exertion, irregular heartbeat, leg swelling, near-syncope, orthopnea, palpitations, paroxysmal nocturnal dyspnea and syncope.   Respiratory:  Negative for chest tightness, cough, hemoptysis, shortness of breath and wheezing.    Endocrine: Negative for cold intolerance and heat intolerance.   Hematologic/Lymphatic: Negative for bleeding problem. Does not bruise/bleed easily.   Skin:  Negative for color change and rash.   Musculoskeletal:  Negative for back pain, falls, muscle weakness and myalgias.   Gastrointestinal:  Negative for abdominal pain, diarrhea, dysphagia, heartburn, hematemesis, hematochezia, hemorrhoids, jaundice, melena, nausea and vomiting.   Genitourinary:  Negative for dysuria and hematuria.   Neurological:  Negative for dizziness, focal weakness, headaches, light-headedness, loss of balance, numbness, tremors, vertigo and weakness.  "  Psychiatric/Behavioral:  Negative for altered mental status, depression and memory loss. The patient is not nervous/anxious.    Allergic/Immunologic: Negative for hives and persistent infections.     Current Outpatient Medications on File Prior to Visit   Medication Sig Dispense Refill    ascorbic acid (VITAMIN C ORAL) Take by mouth once daily. Takes every couple of days      aspirin (ECOTRIN) 81 MG EC tablet Take 1 tablet (81 mg total) by mouth once daily. 90 tablet 3    b complex vitamins capsule Take 1 capsule by mouth once daily.      cholecalciferol, vitamin D3, (VITAMIN D3 ORAL) Take by mouth once daily.      KRILL OIL ORAL Take by mouth once daily.      MAGNESIUM ORAL Take by mouth once daily.      multivitamin (THERAGRAN) per tablet Take 1 tablet by mouth once daily.      UNABLE TO FIND Take by mouth once daily. Colloidal Silver      UNABLE TO FIND medication name: Brite eyes      UNABLE TO FIND Chelation EDTA  Helps to remove heavy metals      lisinopriL (PRINIVIL,ZESTRIL) 5 MG tablet Take 1 tablet (5 mg total) by mouth once daily. (Patient not taking: Reported on 8/4/2025) 90 tablet 3    rosuvastatin (CRESTOR) 10 MG tablet Take 1 tablet (10 mg total) by mouth once daily. (Patient not taking: Reported on 8/4/2025) 90 tablet 3     No current facility-administered medications on file prior to visit.      Objective:     /64   Pulse 71   Ht 5' 10" (1.778 m)   Wt 81.8 kg (180 lb 3.6 oz)   SpO2 98%   BMI 25.86 kg/m²     Physical Exam  Constitutional:       General: He is not in acute distress.     Appearance: Normal appearance. He is well-developed. He is not toxic-appearing or diaphoretic.   HENT:      Head: Normocephalic and atraumatic.      Nose: Nose normal.   Eyes:      General:         Right eye: No discharge.         Left eye: No discharge.      Conjunctiva/sclera:      Right eye: Right conjunctiva is not injected.      Left eye: Left conjunctiva is not injected.      Pupils: Pupils are equal. "      Right eye: Pupil is round.      Left eye: Pupil is round.   Neck:      Thyroid: No thyromegaly.      Vascular: No carotid bruit or JVD.   Cardiovascular:      Rate and Rhythm: Normal rate and regular rhythm. No extrasystoles are present.     Chest Wall: PMI is not displaced.      Pulses:           Radial pulses are 2+ on the right side and 2+ on the left side.        Femoral pulses are 2+ on the right side and 2+ on the left side.       Dorsalis pedis pulses are 2+ on the right side and 2+ on the left side.        Posterior tibial pulses are 2+ on the right side and 2+ on the left side.      Heart sounds: S1 normal and S2 normal.      No gallop.   Pulmonary:      Effort: Pulmonary effort is normal.      Breath sounds: Normal breath sounds.   Abdominal:      Palpations: Abdomen is soft.      Tenderness: There is no abdominal tenderness.   Musculoskeletal:      Cervical back: Neck supple.      Right lower leg: Normal. No swelling. No edema.      Left lower leg: Normal. No swelling. No edema.   Lymphadenopathy:      Head:      Right side of head: No submandibular adenopathy.      Left side of head: No submandibular adenopathy.      Cervical: No cervical adenopathy.   Skin:     General: Skin is warm and dry.      Findings: No rash.      Nails: There is no clubbing.   Neurological:      General: No focal deficit present.      Mental Status: He is alert and oriented to person, place, and time. He is not disoriented.      Cranial Nerves: No cranial nerve deficit.   Psychiatric:         Attention and Perception: Attention normal.         Mood and Affect: Mood and affect normal.         Speech: Speech normal.         Behavior: Behavior normal.         Thought Content: Thought content normal.         Cognition and Memory: Cognition and memory normal.         Judgment: Judgment normal.       Assessment:      1. Coronary artery disease involving native coronary artery of native heart without angina pectoris    2. History  of myocardial infarction    3. History of coronary artery stent placement      Plan:     Coronary Artery Disease              11/2023: Began to experience angina.  12/18/2023: 5 AM: Chest pain at rest. Lateral ST elevation. Received TNK. CP resolved. AIVR. Troponin 45.  12/18/2023: Echo: Normal left ventricular size with overall mild systolic dysfunction. Mid anteroseptal and anterior walls, apical septum and apex severely hypokinetic. Remainder contract well. EF 45%. Mild diastolic dysfunction.   12/19/2023: [facility]: LAD & LCX separate ostea. LAD: Proximal 95%. Mid lengthy 80%. Calcified. D1: Osteal 95%. LCX: Mild. RCA: Dominant. Mild. LAD: Proximal RUBEN 3.0 x 15 mm to 3.2 mm. Mid PTCA 2.5 mm balloon & 2.5 mm cutting balloon. Not possible to cross with stent.  12/20/2023: Reviewed finding during cath and implications with patient.  12/21/2023: Stressed the CRITICAL importance of 100% compliance especially with DAPT.  12/18/2023: Chol 176. HDL 36. . .  1/22/2024: Atorvastatin 80 mg Q24 was changed to rosuvastatin 20 mg Q24. Myalgia on statin and stopped statin.  2/12/2024: Chol 172. HDL 43. . . [Off statin].  5/28/2024: Rosuvastatin 5 mg Q24 was Rx but he decided not to take a statin.  6/1/2024: Clopidogrel 75 mg Q24 was discontinued due to plans for biopsy of the prostate that he did not have.  2/3/2025: Was advised to stop metoprolol 50 mg Q24 but to take lisinopril 5 mg Q24. Rosuvastatin 10 mg Q24 was Rx but he decided not to take it.  On aspirin 81 mg Q24.  Appears stable.    2. PSA Elevation   2/12/2024: PSA 16.   6/10/2024: Plan was prostate biopsy but he decided not to have it done.    Dr. Danny Cisneros.     3. Primary Care   Dr. Enriqueta Gonzalez.    F/u 6 months.    Grace Panda M.D.

## (undated) DEVICE — GUIDEWIRE X SPORT .014IN 190CM

## (undated) DEVICE — GUIDE RUNWAY 6FR CLS 3.5

## (undated) DEVICE — CATH GUIDE LINER  V3 6F

## (undated) DEVICE — CATH BLLN WOLVERINE 15X2.5MM

## (undated) DEVICE — CATH EMERGE MR 30 X 2.50

## (undated) DEVICE — STOPCOCK 3 WAY MED PRESSURE

## (undated) DEVICE — HEMOSTAT VASC BAND REG 24CM

## (undated) DEVICE — IMPLANTABLE DEVICE
Type: IMPLANTABLE DEVICE | Site: CORONARY | Status: NON-FUNCTIONAL
Removed: 2023-12-19

## (undated) DEVICE — CATH NC EMERGE MR 2.5X20MM

## (undated) DEVICE — CATH EMERGE MR 20 X 2.50

## (undated) DEVICE — GUIDEWIRE SAFE T .035IN 180CM

## (undated) DEVICE — CATH OPTITORQUE RADIAL 5FR

## (undated) DEVICE — CATH EMERGE MR 15 X 2.50

## (undated) DEVICE — KIT GLIDESHEATH SLEND 6FR 10CM

## (undated) DEVICE — CATH EMERGE MR 30 X 2.00

## (undated) DEVICE — COVER SANP CLR 36X54

## (undated) DEVICE — OMNIPAQUE 350MG 150ML VIAL

## (undated) DEVICE — PRESTO INFLATION DEVICE

## (undated) DEVICE — KIT ESSENTIALS W/ Y ADAPTER

## (undated) DEVICE — DRAPE ANGIO BRACH 38X44IN

## (undated) DEVICE — GUIDEWIRE ANGIO 1.5MM .035X180